# Patient Record
Sex: FEMALE | Race: BLACK OR AFRICAN AMERICAN | NOT HISPANIC OR LATINO | URBAN - METROPOLITAN AREA
[De-identification: names, ages, dates, MRNs, and addresses within clinical notes are randomized per-mention and may not be internally consistent; named-entity substitution may affect disease eponyms.]

---

## 2017-01-01 ENCOUNTER — INPATIENT (INPATIENT)
Facility: HOSPITAL | Age: 77
LOS: 1 days | DRG: 871 | End: 2017-01-23
Attending: INTERNAL MEDICINE | Admitting: INTERNAL MEDICINE
Payer: MEDICARE

## 2017-01-01 ENCOUNTER — INPATIENT (INPATIENT)
Facility: HOSPITAL | Age: 77
LOS: 9 days | Discharge: NURSING, REHAB OR LONG-TERM CARE | DRG: 871 | End: 2017-01-12
Attending: INTERNAL MEDICINE | Admitting: INTERNAL MEDICINE
Payer: MEDICARE

## 2017-01-01 VITALS
RESPIRATION RATE: 18 BRPM | DIASTOLIC BLOOD PRESSURE: 66 MMHG | WEIGHT: 134.92 LBS | TEMPERATURE: 98 F | HEART RATE: 118 BPM | OXYGEN SATURATION: 97 % | SYSTOLIC BLOOD PRESSURE: 128 MMHG | HEIGHT: 66 IN

## 2017-01-01 VITALS
WEIGHT: 145.06 LBS | TEMPERATURE: 101 F | RESPIRATION RATE: 22 BRPM | DIASTOLIC BLOOD PRESSURE: 72 MMHG | HEART RATE: 104 BPM | HEIGHT: 66 IN | OXYGEN SATURATION: 98 % | SYSTOLIC BLOOD PRESSURE: 105 MMHG

## 2017-01-01 VITALS
SYSTOLIC BLOOD PRESSURE: 136 MMHG | HEART RATE: 83 BPM | TEMPERATURE: 99 F | DIASTOLIC BLOOD PRESSURE: 76 MMHG | OXYGEN SATURATION: 99 % | RESPIRATION RATE: 18 BRPM

## 2017-01-01 VITALS — TEMPERATURE: 100 F

## 2017-01-01 DIAGNOSIS — G30.1 ALZHEIMER'S DISEASE WITH LATE ONSET: ICD-10-CM

## 2017-01-01 DIAGNOSIS — I48.2 CHRONIC ATRIAL FIBRILLATION: ICD-10-CM

## 2017-01-01 DIAGNOSIS — R13.10 DYSPHAGIA, UNSPECIFIED: ICD-10-CM

## 2017-01-01 DIAGNOSIS — J69.0 PNEUMONITIS DUE TO INHALATION OF FOOD AND VOMIT: ICD-10-CM

## 2017-01-01 DIAGNOSIS — Z71.89 OTHER SPECIFIED COUNSELING: ICD-10-CM

## 2017-01-01 DIAGNOSIS — A41.9 SEPSIS, UNSPECIFIED ORGANISM: ICD-10-CM

## 2017-01-01 DIAGNOSIS — I10 ESSENTIAL (PRIMARY) HYPERTENSION: ICD-10-CM

## 2017-01-01 DIAGNOSIS — R79.89 OTHER SPECIFIED ABNORMAL FINDINGS OF BLOOD CHEMISTRY: ICD-10-CM

## 2017-01-01 DIAGNOSIS — Z66 DO NOT RESUSCITATE: ICD-10-CM

## 2017-01-01 DIAGNOSIS — G40.909 EPILEPSY, UNSPECIFIED, NOT INTRACTABLE, WITHOUT STATUS EPILEPTICUS: ICD-10-CM

## 2017-01-01 DIAGNOSIS — Z86.73 PERSONAL HISTORY OF TRANSIENT ISCHEMIC ATTACK (TIA), AND CEREBRAL INFARCTION WITHOUT RESIDUAL DEFICITS: ICD-10-CM

## 2017-01-01 DIAGNOSIS — R56.9 UNSPECIFIED CONVULSIONS: ICD-10-CM

## 2017-01-01 DIAGNOSIS — Z29.9 ENCOUNTER FOR PROPHYLACTIC MEASURES, UNSPECIFIED: ICD-10-CM

## 2017-01-01 DIAGNOSIS — R94.5 ABNORMAL RESULTS OF LIVER FUNCTION STUDIES: ICD-10-CM

## 2017-01-01 DIAGNOSIS — F02.81 DEMENTIA IN OTHER DISEASES CLASSIFIED ELSEWHERE, UNSPECIFIED SEVERITY, WITH BEHAVIORAL DISTURBANCE: ICD-10-CM

## 2017-01-01 DIAGNOSIS — G30.9 ALZHEIMER'S DISEASE, UNSPECIFIED: ICD-10-CM

## 2017-01-01 DIAGNOSIS — E86.0 DEHYDRATION: ICD-10-CM

## 2017-01-01 DIAGNOSIS — J18.9 PNEUMONIA, UNSPECIFIED ORGANISM: ICD-10-CM

## 2017-01-01 DIAGNOSIS — L98.492 NON-PRESSURE CHRONIC ULCER OF SKIN OF OTHER SITES WITH FAT LAYER EXPOSED: ICD-10-CM

## 2017-01-01 DIAGNOSIS — E87.6 HYPOKALEMIA: ICD-10-CM

## 2017-01-01 DIAGNOSIS — L89.154 PRESSURE ULCER OF SACRAL REGION, STAGE 4: ICD-10-CM

## 2017-01-01 DIAGNOSIS — N39.0 URINARY TRACT INFECTION, SITE NOT SPECIFIED: ICD-10-CM

## 2017-01-01 DIAGNOSIS — I48.91 UNSPECIFIED ATRIAL FIBRILLATION: ICD-10-CM

## 2017-01-01 LAB
24R-OH-CALCIDIOL SERPL-MCNC: 26 NG/ML — LOW (ref 30–100)
24R-OH-CALCIDIOL SERPL-MCNC: 30.3 NG/ML — SIGNIFICANT CHANGE UP (ref 30–100)
ALBUMIN SERPL ELPH-MCNC: 1.6 G/DL — LOW (ref 3.5–5)
ALBUMIN SERPL ELPH-MCNC: 2 G/DL — LOW (ref 3.5–5)
ALBUMIN SERPL ELPH-MCNC: 2.1 G/DL — LOW (ref 3.5–5)
ALBUMIN SERPL ELPH-MCNC: 2.3 G/DL — LOW (ref 3.5–5)
ALBUMIN SERPL ELPH-MCNC: 2.5 G/DL — LOW (ref 3.5–5)
ALBUMIN SERPL ELPH-MCNC: 2.5 G/DL — LOW (ref 3.5–5)
ALP SERPL-CCNC: 137 U/L — HIGH (ref 40–120)
ALP SERPL-CCNC: 173 U/L — HIGH (ref 40–120)
ALP SERPL-CCNC: 175 U/L — HIGH (ref 40–120)
ALP SERPL-CCNC: 226 U/L — HIGH (ref 40–120)
ALP SERPL-CCNC: 229 U/L — HIGH (ref 40–120)
ALP SERPL-CCNC: 269 U/L — HIGH (ref 40–120)
ALT FLD-CCNC: 103 U/L DA — HIGH (ref 10–60)
ALT FLD-CCNC: 127 U/L DA — HIGH (ref 10–60)
ALT FLD-CCNC: 159 U/L DA — HIGH (ref 10–60)
ALT FLD-CCNC: 54 U/L DA — SIGNIFICANT CHANGE UP (ref 10–60)
ALT FLD-CCNC: 76 U/L DA — HIGH (ref 10–60)
ALT FLD-CCNC: 84 U/L DA — HIGH (ref 10–60)
ANION GAP SERPL CALC-SCNC: 10 MMOL/L — SIGNIFICANT CHANGE UP (ref 5–17)
ANION GAP SERPL CALC-SCNC: 4 MMOL/L — LOW (ref 5–17)
ANION GAP SERPL CALC-SCNC: 5 MMOL/L — SIGNIFICANT CHANGE UP (ref 5–17)
ANION GAP SERPL CALC-SCNC: 6 MMOL/L — SIGNIFICANT CHANGE UP (ref 5–17)
ANION GAP SERPL CALC-SCNC: 7 MMOL/L — SIGNIFICANT CHANGE UP (ref 5–17)
ANION GAP SERPL CALC-SCNC: 8 MMOL/L — SIGNIFICANT CHANGE UP (ref 5–17)
ANION GAP SERPL CALC-SCNC: 9 MMOL/L — SIGNIFICANT CHANGE UP (ref 5–17)
APPEARANCE UR: CLEAR — SIGNIFICANT CHANGE UP
APTT BLD: 24.7 SEC — LOW (ref 27.5–37.4)
AST SERPL-CCNC: 150 U/L — HIGH (ref 10–40)
AST SERPL-CCNC: 34 U/L — SIGNIFICANT CHANGE UP (ref 10–40)
AST SERPL-CCNC: 42 U/L — HIGH (ref 10–40)
AST SERPL-CCNC: 67 U/L — HIGH (ref 10–40)
AST SERPL-CCNC: 83 U/L — HIGH (ref 10–40)
AST SERPL-CCNC: 85 U/L — HIGH (ref 10–40)
BASE EXCESS BLDA CALC-SCNC: 12.4 MMOL/L — HIGH (ref -2–2)
BASOPHILS # BLD AUTO: 0.1 K/UL — SIGNIFICANT CHANGE UP (ref 0–0.2)
BASOPHILS # BLD AUTO: 0.1 K/UL — SIGNIFICANT CHANGE UP (ref 0–0.2)
BASOPHILS # BLD AUTO: 0.2 K/UL — SIGNIFICANT CHANGE UP (ref 0–0.2)
BASOPHILS # BLD AUTO: 0.2 K/UL — SIGNIFICANT CHANGE UP (ref 0–0.2)
BASOPHILS # BLD AUTO: 0.3 K/UL — HIGH (ref 0–0.2)
BASOPHILS NFR BLD AUTO: 0.5 % — SIGNIFICANT CHANGE UP (ref 0–2)
BASOPHILS NFR BLD AUTO: 0.8 % — SIGNIFICANT CHANGE UP (ref 0–2)
BASOPHILS NFR BLD AUTO: 0.9 % — SIGNIFICANT CHANGE UP (ref 0–2)
BASOPHILS NFR BLD AUTO: 1.2 % — SIGNIFICANT CHANGE UP (ref 0–2)
BASOPHILS NFR BLD AUTO: 1.5 % — SIGNIFICANT CHANGE UP (ref 0–2)
BILIRUB DIRECT SERPL-MCNC: 0.1 MG/DL — SIGNIFICANT CHANGE UP (ref 0–0.2)
BILIRUB INDIRECT FLD-MCNC: 0.2 MG/DL — SIGNIFICANT CHANGE UP (ref 0.2–1)
BILIRUB SERPL-MCNC: 0.2 MG/DL — SIGNIFICANT CHANGE UP (ref 0.2–1.2)
BILIRUB SERPL-MCNC: 0.3 MG/DL — SIGNIFICANT CHANGE UP (ref 0.2–1.2)
BILIRUB SERPL-MCNC: 0.3 MG/DL — SIGNIFICANT CHANGE UP (ref 0.2–1.2)
BILIRUB SERPL-MCNC: 0.4 MG/DL — SIGNIFICANT CHANGE UP (ref 0.2–1.2)
BILIRUB SERPL-MCNC: 0.4 MG/DL — SIGNIFICANT CHANGE UP (ref 0.2–1.2)
BILIRUB SERPL-MCNC: 0.5 MG/DL — SIGNIFICANT CHANGE UP (ref 0.2–1.2)
BILIRUB UR-MCNC: NEGATIVE — SIGNIFICANT CHANGE UP
BLOOD GAS COMMENTS ARTERIAL: SIGNIFICANT CHANGE UP
BUN SERPL-MCNC: 10 MG/DL — SIGNIFICANT CHANGE UP (ref 7–18)
BUN SERPL-MCNC: 10 MG/DL — SIGNIFICANT CHANGE UP (ref 7–18)
BUN SERPL-MCNC: 15 MG/DL — SIGNIFICANT CHANGE UP (ref 7–18)
BUN SERPL-MCNC: 18 MG/DL — SIGNIFICANT CHANGE UP (ref 7–18)
BUN SERPL-MCNC: 24 MG/DL — HIGH (ref 7–18)
BUN SERPL-MCNC: 29 MG/DL — HIGH (ref 7–18)
BUN SERPL-MCNC: 37 MG/DL — HIGH (ref 7–18)
BUN SERPL-MCNC: 55 MG/DL — HIGH (ref 7–18)
BUN SERPL-MCNC: 74 MG/DL — HIGH (ref 7–18)
CALCIUM SERPL-MCNC: 8.6 MG/DL — SIGNIFICANT CHANGE UP (ref 8.4–10.5)
CALCIUM SERPL-MCNC: 8.6 MG/DL — SIGNIFICANT CHANGE UP (ref 8.4–10.5)
CALCIUM SERPL-MCNC: 8.7 MG/DL — SIGNIFICANT CHANGE UP (ref 8.4–10.5)
CALCIUM SERPL-MCNC: 8.9 MG/DL — SIGNIFICANT CHANGE UP (ref 8.4–10.5)
CALCIUM SERPL-MCNC: 9 MG/DL — SIGNIFICANT CHANGE UP (ref 8.4–10.5)
CALCIUM SERPL-MCNC: 9.6 MG/DL — SIGNIFICANT CHANGE UP (ref 8.4–10.5)
CALCIUM SERPL-MCNC: 9.9 MG/DL — SIGNIFICANT CHANGE UP (ref 8.4–10.5)
CHLORIDE SERPL-SCNC: 103 MMOL/L — SIGNIFICANT CHANGE UP (ref 96–108)
CHLORIDE SERPL-SCNC: 104 MMOL/L — SIGNIFICANT CHANGE UP (ref 96–108)
CHLORIDE SERPL-SCNC: 106 MMOL/L — SIGNIFICANT CHANGE UP (ref 96–108)
CHLORIDE SERPL-SCNC: 106 MMOL/L — SIGNIFICANT CHANGE UP (ref 96–108)
CHLORIDE SERPL-SCNC: 107 MMOL/L — SIGNIFICANT CHANGE UP (ref 96–108)
CHLORIDE SERPL-SCNC: 107 MMOL/L — SIGNIFICANT CHANGE UP (ref 96–108)
CHLORIDE SERPL-SCNC: 110 MMOL/L — HIGH (ref 96–108)
CHLORIDE SERPL-SCNC: 110 MMOL/L — HIGH (ref 96–108)
CHLORIDE SERPL-SCNC: 99 MMOL/L — SIGNIFICANT CHANGE UP (ref 96–108)
CHOLEST SERPL-MCNC: 247 MG/DL — HIGH (ref 10–199)
CHOLEST SERPL-MCNC: 256 MG/DL — HIGH (ref 10–199)
CK MB BLD-MCNC: <5 % — HIGH (ref 0–3.5)
CK MB CFR SERPL CALC: <1 NG/ML — SIGNIFICANT CHANGE UP (ref 0–3.6)
CK SERPL-CCNC: 20 U/L — LOW (ref 21–215)
CO2 SERPL-SCNC: 29 MMOL/L — SIGNIFICANT CHANGE UP (ref 22–31)
CO2 SERPL-SCNC: 30 MMOL/L — SIGNIFICANT CHANGE UP (ref 22–31)
CO2 SERPL-SCNC: 30 MMOL/L — SIGNIFICANT CHANGE UP (ref 22–31)
CO2 SERPL-SCNC: 31 MMOL/L — SIGNIFICANT CHANGE UP (ref 22–31)
CO2 SERPL-SCNC: 32 MMOL/L — HIGH (ref 22–31)
CO2 SERPL-SCNC: 32 MMOL/L — HIGH (ref 22–31)
CO2 SERPL-SCNC: 36 MMOL/L — HIGH (ref 22–31)
CO2 SERPL-SCNC: 37 MMOL/L — HIGH (ref 22–31)
CO2 SERPL-SCNC: 44 MMOL/L — HIGH (ref 22–31)
COLOR SPEC: SIGNIFICANT CHANGE UP
CREAT SERPL-MCNC: 0.38 MG/DL — LOW (ref 0.5–1.3)
CREAT SERPL-MCNC: 0.38 MG/DL — LOW (ref 0.5–1.3)
CREAT SERPL-MCNC: 0.39 MG/DL — LOW (ref 0.5–1.3)
CREAT SERPL-MCNC: 0.42 MG/DL — LOW (ref 0.5–1.3)
CREAT SERPL-MCNC: 0.44 MG/DL — LOW (ref 0.5–1.3)
CREAT SERPL-MCNC: 0.51 MG/DL — SIGNIFICANT CHANGE UP (ref 0.5–1.3)
CREAT SERPL-MCNC: 0.61 MG/DL — SIGNIFICANT CHANGE UP (ref 0.5–1.3)
CREAT SERPL-MCNC: 0.77 MG/DL — SIGNIFICANT CHANGE UP (ref 0.5–1.3)
CREAT SERPL-MCNC: 0.83 MG/DL — SIGNIFICANT CHANGE UP (ref 0.5–1.3)
CULTURE RESULTS: NO GROWTH — SIGNIFICANT CHANGE UP
CULTURE RESULTS: SIGNIFICANT CHANGE UP
CULTURE RESULTS: SIGNIFICANT CHANGE UP
DIFF PNL FLD: ABNORMAL
EOSINOPHIL # BLD AUTO: 0 K/UL — SIGNIFICANT CHANGE UP (ref 0–0.5)
EOSINOPHIL # BLD AUTO: 0.1 K/UL — SIGNIFICANT CHANGE UP (ref 0–0.5)
EOSINOPHIL # BLD AUTO: 0.1 K/UL — SIGNIFICANT CHANGE UP (ref 0–0.5)
EOSINOPHIL NFR BLD AUTO: 0 % — SIGNIFICANT CHANGE UP (ref 0–6)
EOSINOPHIL NFR BLD AUTO: 0 % — SIGNIFICANT CHANGE UP (ref 0–6)
EOSINOPHIL NFR BLD AUTO: 0.1 % — SIGNIFICANT CHANGE UP (ref 0–6)
EOSINOPHIL NFR BLD AUTO: 0.3 % — SIGNIFICANT CHANGE UP (ref 0–6)
EOSINOPHIL NFR BLD AUTO: 1.2 % — SIGNIFICANT CHANGE UP (ref 0–6)
FLUAV SPEC QL CULT: NEGATIVE — SIGNIFICANT CHANGE UP
FLUBV AG SPEC QL IA: NEGATIVE — SIGNIFICANT CHANGE UP
FOLATE SERPL-MCNC: >20 NG/ML — SIGNIFICANT CHANGE UP (ref 4.8–24.2)
FOLATE SERPL-MCNC: >20 NG/ML — SIGNIFICANT CHANGE UP (ref 4.8–24.2)
GLUCOSE SERPL-MCNC: 118 MG/DL — HIGH (ref 70–99)
GLUCOSE SERPL-MCNC: 126 MG/DL — HIGH (ref 70–99)
GLUCOSE SERPL-MCNC: 131 MG/DL — HIGH (ref 70–99)
GLUCOSE SERPL-MCNC: 132 MG/DL — HIGH (ref 70–99)
GLUCOSE SERPL-MCNC: 138 MG/DL — HIGH (ref 70–99)
GLUCOSE SERPL-MCNC: 159 MG/DL — HIGH (ref 70–99)
GLUCOSE SERPL-MCNC: 189 MG/DL — HIGH (ref 70–99)
GLUCOSE SERPL-MCNC: 222 MG/DL — HIGH (ref 70–99)
GLUCOSE SERPL-MCNC: 238 MG/DL — HIGH (ref 70–99)
GLUCOSE UR QL: NEGATIVE — SIGNIFICANT CHANGE UP
GRAM STN FLD: SIGNIFICANT CHANGE UP
HBA1C BLD-MCNC: 6.2 % — HIGH (ref 4–5.6)
HBA1C BLD-MCNC: 6.4 % — HIGH (ref 4–5.6)
HCO3 BLDA-SCNC: 40 MMOL/L — HIGH (ref 23–27)
HCT VFR BLD CALC: 33.8 % — LOW (ref 34.5–45)
HCT VFR BLD CALC: 34.7 % — SIGNIFICANT CHANGE UP (ref 34.5–45)
HCT VFR BLD CALC: 35.2 % — SIGNIFICANT CHANGE UP (ref 34.5–45)
HCT VFR BLD CALC: 35.3 % — SIGNIFICANT CHANGE UP (ref 34.5–45)
HCT VFR BLD CALC: 39 % — SIGNIFICANT CHANGE UP (ref 34.5–45)
HCT VFR BLD CALC: 39 % — SIGNIFICANT CHANGE UP (ref 34.5–45)
HCT VFR BLD CALC: 41 % — SIGNIFICANT CHANGE UP (ref 34.5–45)
HCT VFR BLD CALC: 41.3 % — SIGNIFICANT CHANGE UP (ref 34.5–45)
HCT VFR BLD CALC: 47 % — HIGH (ref 34.5–45)
HDLC SERPL-MCNC: 28 MG/DL — LOW (ref 40–125)
HDLC SERPL-MCNC: 46 MG/DL — SIGNIFICANT CHANGE UP (ref 40–125)
HGB BLD-MCNC: 11 G/DL — LOW (ref 11.5–15.5)
HGB BLD-MCNC: 11.2 G/DL — LOW (ref 11.5–15.5)
HGB BLD-MCNC: 11.3 G/DL — LOW (ref 11.5–15.5)
HGB BLD-MCNC: 11.6 G/DL — SIGNIFICANT CHANGE UP (ref 11.5–15.5)
HGB BLD-MCNC: 12.4 G/DL — SIGNIFICANT CHANGE UP (ref 11.5–15.5)
HGB BLD-MCNC: 12.8 G/DL — SIGNIFICANT CHANGE UP (ref 11.5–15.5)
HGB BLD-MCNC: 12.9 G/DL — SIGNIFICANT CHANGE UP (ref 11.5–15.5)
HGB BLD-MCNC: 13.2 G/DL — SIGNIFICANT CHANGE UP (ref 11.5–15.5)
HGB BLD-MCNC: 14.5 G/DL — SIGNIFICANT CHANGE UP (ref 11.5–15.5)
HOROWITZ INDEX BLDA+IHG-RTO: 100 — SIGNIFICANT CHANGE UP
INR BLD: 1.04 RATIO — SIGNIFICANT CHANGE UP (ref 0.88–1.16)
KETONES UR-MCNC: NEGATIVE — SIGNIFICANT CHANGE UP
LACTATE SERPL-SCNC: 1.1 MMOL/L — SIGNIFICANT CHANGE UP (ref 0.7–2)
LACTATE SERPL-SCNC: 1.8 MMOL/L — SIGNIFICANT CHANGE UP (ref 0.7–2)
LACTATE SERPL-SCNC: 2.4 MMOL/L — HIGH (ref 0.7–2)
LEUKOCYTE ESTERASE UR-ACNC: ABNORMAL
LIPID PNL WITH DIRECT LDL SERPL: 169 MG/DL — SIGNIFICANT CHANGE UP
LIPID PNL WITH DIRECT LDL SERPL: 172 MG/DL — SIGNIFICANT CHANGE UP
LYMPHOCYTES # BLD AUTO: 1.4 K/UL — SIGNIFICANT CHANGE UP (ref 1–3.3)
LYMPHOCYTES # BLD AUTO: 1.7 K/UL — SIGNIFICANT CHANGE UP (ref 1–3.3)
LYMPHOCYTES # BLD AUTO: 1.9 K/UL — SIGNIFICANT CHANGE UP (ref 1–3.3)
LYMPHOCYTES # BLD AUTO: 10.5 % — LOW (ref 13–44)
LYMPHOCYTES # BLD AUTO: 12 % — LOW (ref 13–44)
LYMPHOCYTES # BLD AUTO: 2.3 K/UL — SIGNIFICANT CHANGE UP (ref 1–3.3)
LYMPHOCYTES # BLD AUTO: 2.8 K/UL — SIGNIFICANT CHANGE UP (ref 1–3.3)
LYMPHOCYTES # BLD AUTO: 24.4 % — SIGNIFICANT CHANGE UP (ref 13–44)
LYMPHOCYTES # BLD AUTO: 3 % — LOW (ref 13–44)
LYMPHOCYTES # BLD AUTO: 8.4 % — LOW (ref 13–44)
LYMPHOCYTES # BLD AUTO: 8.5 % — LOW (ref 13–44)
LYMPHOCYTES # BLD AUTO: 9.4 % — LOW (ref 13–44)
MAGNESIUM SERPL-MCNC: 2.4 MG/DL — SIGNIFICANT CHANGE UP (ref 1.8–2.4)
MAGNESIUM SERPL-MCNC: 2.5 MG/DL — HIGH (ref 1.8–2.4)
MAGNESIUM SERPL-MCNC: 2.7 MG/DL — HIGH (ref 1.8–2.4)
MANUAL DIF COMMENT BLD-IMP: SIGNIFICANT CHANGE UP
MCHC RBC-ENTMCNC: 30.9 GM/DL — LOW (ref 32–36)
MCHC RBC-ENTMCNC: 31.2 PG — SIGNIFICANT CHANGE UP (ref 27–34)
MCHC RBC-ENTMCNC: 31.2 PG — SIGNIFICANT CHANGE UP (ref 27–34)
MCHC RBC-ENTMCNC: 31.5 GM/DL — LOW (ref 32–36)
MCHC RBC-ENTMCNC: 31.7 PG — SIGNIFICANT CHANGE UP (ref 27–34)
MCHC RBC-ENTMCNC: 31.8 GM/DL — LOW (ref 32–36)
MCHC RBC-ENTMCNC: 31.8 GM/DL — LOW (ref 32–36)
MCHC RBC-ENTMCNC: 31.9 PG — SIGNIFICANT CHANGE UP (ref 27–34)
MCHC RBC-ENTMCNC: 31.9 PG — SIGNIFICANT CHANGE UP (ref 27–34)
MCHC RBC-ENTMCNC: 32 GM/DL — SIGNIFICANT CHANGE UP (ref 32–36)
MCHC RBC-ENTMCNC: 32 PG — SIGNIFICANT CHANGE UP (ref 27–34)
MCHC RBC-ENTMCNC: 32 PG — SIGNIFICANT CHANGE UP (ref 27–34)
MCHC RBC-ENTMCNC: 32.4 GM/DL — SIGNIFICANT CHANGE UP (ref 32–36)
MCHC RBC-ENTMCNC: 32.5 PG — SIGNIFICANT CHANGE UP (ref 27–34)
MCHC RBC-ENTMCNC: 32.7 GM/DL — SIGNIFICANT CHANGE UP (ref 32–36)
MCHC RBC-ENTMCNC: 32.8 GM/DL — SIGNIFICANT CHANGE UP (ref 32–36)
MCHC RBC-ENTMCNC: 32.9 GM/DL — SIGNIFICANT CHANGE UP (ref 32–36)
MCHC RBC-ENTMCNC: 33.2 PG — SIGNIFICANT CHANGE UP (ref 27–34)
MCV RBC AUTO: 103 FL — HIGH (ref 80–100)
MCV RBC AUTO: 103.3 FL — HIGH (ref 80–100)
MCV RBC AUTO: 103.7 FL — HIGH (ref 80–100)
MCV RBC AUTO: 97 FL — SIGNIFICANT CHANGE UP (ref 80–100)
MCV RBC AUTO: 97.1 FL — SIGNIFICANT CHANGE UP (ref 80–100)
MCV RBC AUTO: 97.4 FL — SIGNIFICANT CHANGE UP (ref 80–100)
MCV RBC AUTO: 98 FL — SIGNIFICANT CHANGE UP (ref 80–100)
MCV RBC AUTO: 98.1 FL — SIGNIFICANT CHANGE UP (ref 80–100)
MCV RBC AUTO: 98.4 FL — SIGNIFICANT CHANGE UP (ref 80–100)
MONOCYTES # BLD AUTO: 0.7 K/UL — SIGNIFICANT CHANGE UP (ref 0–0.9)
MONOCYTES # BLD AUTO: 0.7 K/UL — SIGNIFICANT CHANGE UP (ref 0–0.9)
MONOCYTES # BLD AUTO: 0.9 K/UL — SIGNIFICANT CHANGE UP (ref 0–0.9)
MONOCYTES # BLD AUTO: 1 K/UL — HIGH (ref 0–0.9)
MONOCYTES # BLD AUTO: 1.3 K/UL — HIGH (ref 0–0.9)
MONOCYTES NFR BLD AUTO: 3.4 % — SIGNIFICANT CHANGE UP (ref 2–14)
MONOCYTES NFR BLD AUTO: 4.3 % — SIGNIFICANT CHANGE UP (ref 2–14)
MONOCYTES NFR BLD AUTO: 4.8 % — SIGNIFICANT CHANGE UP (ref 2–14)
MONOCYTES NFR BLD AUTO: 6.1 % — SIGNIFICANT CHANGE UP (ref 2–14)
MONOCYTES NFR BLD AUTO: 7 % — SIGNIFICANT CHANGE UP (ref 2–14)
MONOCYTES NFR BLD AUTO: 7 % — SIGNIFICANT CHANGE UP (ref 2–14)
MONOCYTES NFR BLD AUTO: 7.5 % — SIGNIFICANT CHANGE UP (ref 2–14)
NEUTROPHILS # BLD AUTO: 14 K/UL — HIGH (ref 1.8–7.4)
NEUTROPHILS # BLD AUTO: 17.5 K/UL — HIGH (ref 1.8–7.4)
NEUTROPHILS # BLD AUTO: 17.6 K/UL — HIGH (ref 1.8–7.4)
NEUTROPHILS # BLD AUTO: 17.9 K/UL — HIGH (ref 1.8–7.4)
NEUTROPHILS # BLD AUTO: 7.6 K/UL — HIGH (ref 1.8–7.4)
NEUTROPHILS NFR BLD AUTO: 65.4 % — SIGNIFICANT CHANGE UP (ref 43–77)
NEUTROPHILS NFR BLD AUTO: 78 % — HIGH (ref 43–77)
NEUTROPHILS NFR BLD AUTO: 82 % — HIGH (ref 43–77)
NEUTROPHILS NFR BLD AUTO: 83 % — HIGH (ref 43–77)
NEUTROPHILS NFR BLD AUTO: 85.9 % — HIGH (ref 43–77)
NEUTROPHILS NFR BLD AUTO: 86.4 % — HIGH (ref 43–77)
NEUTROPHILS NFR BLD AUTO: 86.6 % — HIGH (ref 43–77)
NEUTS BAND # BLD: 3 % — SIGNIFICANT CHANGE UP (ref 0–8)
NITRITE UR-MCNC: NEGATIVE — SIGNIFICANT CHANGE UP
NT-PROBNP SERPL-SCNC: 183 PG/ML — SIGNIFICANT CHANGE UP (ref 0–450)
NT-PROBNP SERPL-SCNC: 321 PG/ML — SIGNIFICANT CHANGE UP (ref 0–450)
PCO2 BLDA: 73 MMHG — CRITICAL HIGH (ref 32–46)
PH BLDA: 7.36 — SIGNIFICANT CHANGE UP (ref 7.35–7.45)
PH UR: 8 — SIGNIFICANT CHANGE UP (ref 4.8–8)
PHOSPHATE SERPL-MCNC: 1.5 MG/DL — LOW (ref 2.5–4.5)
PHOSPHATE SERPL-MCNC: 1.7 MG/DL — LOW (ref 2.5–4.5)
PHOSPHATE SERPL-MCNC: 2.2 MG/DL — LOW (ref 2.5–4.5)
PHOSPHATE SERPL-MCNC: 2.2 MG/DL — LOW (ref 2.5–4.5)
PHOSPHATE SERPL-MCNC: 3.1 MG/DL — SIGNIFICANT CHANGE UP (ref 2.5–4.5)
PLAT MORPH BLD: NORMAL — SIGNIFICANT CHANGE UP
PLATELET # BLD AUTO: 215 K/UL — SIGNIFICANT CHANGE UP (ref 150–400)
PLATELET # BLD AUTO: 220 K/UL — SIGNIFICANT CHANGE UP (ref 150–400)
PLATELET # BLD AUTO: 231 K/UL — SIGNIFICANT CHANGE UP (ref 150–400)
PLATELET # BLD AUTO: 248 K/UL — SIGNIFICANT CHANGE UP (ref 150–400)
PLATELET # BLD AUTO: 282 K/UL — SIGNIFICANT CHANGE UP (ref 150–400)
PLATELET # BLD AUTO: 299 K/UL — SIGNIFICANT CHANGE UP (ref 150–400)
PLATELET # BLD AUTO: 314 K/UL — SIGNIFICANT CHANGE UP (ref 150–400)
PLATELET # BLD AUTO: 324 K/UL — SIGNIFICANT CHANGE UP (ref 150–400)
PLATELET # BLD AUTO: 355 K/UL — SIGNIFICANT CHANGE UP (ref 150–400)
PO2 BLDA: 149 MMHG — HIGH (ref 74–108)
POTASSIUM SERPL-MCNC: 3 MMOL/L — LOW (ref 3.5–5.3)
POTASSIUM SERPL-MCNC: 3.4 MMOL/L — LOW (ref 3.5–5.3)
POTASSIUM SERPL-MCNC: 3.5 MMOL/L — SIGNIFICANT CHANGE UP (ref 3.5–5.3)
POTASSIUM SERPL-MCNC: 3.6 MMOL/L — SIGNIFICANT CHANGE UP (ref 3.5–5.3)
POTASSIUM SERPL-MCNC: 3.9 MMOL/L — SIGNIFICANT CHANGE UP (ref 3.5–5.3)
POTASSIUM SERPL-MCNC: 4 MMOL/L — SIGNIFICANT CHANGE UP (ref 3.5–5.3)
POTASSIUM SERPL-MCNC: 4.2 MMOL/L — SIGNIFICANT CHANGE UP (ref 3.5–5.3)
POTASSIUM SERPL-MCNC: 4.3 MMOL/L — SIGNIFICANT CHANGE UP (ref 3.5–5.3)
POTASSIUM SERPL-MCNC: 4.5 MMOL/L — SIGNIFICANT CHANGE UP (ref 3.5–5.3)
POTASSIUM SERPL-SCNC: 3 MMOL/L — LOW (ref 3.5–5.3)
POTASSIUM SERPL-SCNC: 3.4 MMOL/L — LOW (ref 3.5–5.3)
POTASSIUM SERPL-SCNC: 3.5 MMOL/L — SIGNIFICANT CHANGE UP (ref 3.5–5.3)
POTASSIUM SERPL-SCNC: 3.6 MMOL/L — SIGNIFICANT CHANGE UP (ref 3.5–5.3)
POTASSIUM SERPL-SCNC: 3.9 MMOL/L — SIGNIFICANT CHANGE UP (ref 3.5–5.3)
POTASSIUM SERPL-SCNC: 4 MMOL/L — SIGNIFICANT CHANGE UP (ref 3.5–5.3)
POTASSIUM SERPL-SCNC: 4.2 MMOL/L — SIGNIFICANT CHANGE UP (ref 3.5–5.3)
POTASSIUM SERPL-SCNC: 4.3 MMOL/L — SIGNIFICANT CHANGE UP (ref 3.5–5.3)
POTASSIUM SERPL-SCNC: 4.5 MMOL/L — SIGNIFICANT CHANGE UP (ref 3.5–5.3)
PROCALCITONIN SERPL-MCNC: 0.79 NG/ML — HIGH (ref 0–0.05)
PROT SERPL-MCNC: 6.9 G/DL — SIGNIFICANT CHANGE UP (ref 6–8.3)
PROT SERPL-MCNC: 7.2 G/DL — SIGNIFICANT CHANGE UP (ref 6–8.3)
PROT SERPL-MCNC: 7.6 G/DL — SIGNIFICANT CHANGE UP (ref 6–8.3)
PROT SERPL-MCNC: 8.1 G/DL — SIGNIFICANT CHANGE UP (ref 6–8.3)
PROT SERPL-MCNC: 8.4 G/DL — HIGH (ref 6–8.3)
PROT SERPL-MCNC: 8.4 G/DL — HIGH (ref 6–8.3)
PROT UR-MCNC: 30 MG/DL
PROTHROM AB SERPL-ACNC: 11.7 SEC — SIGNIFICANT CHANGE UP (ref 10–13.1)
RBC # BLD: 3.4 M/UL — LOW (ref 3.8–5.2)
RBC # BLD: 3.48 M/UL — LOW (ref 3.8–5.2)
RBC # BLD: 3.53 M/UL — LOW (ref 3.8–5.2)
RBC # BLD: 3.63 M/UL — LOW (ref 3.8–5.2)
RBC # BLD: 3.98 M/UL — SIGNIFICANT CHANGE UP (ref 3.8–5.2)
RBC # BLD: 3.98 M/UL — SIGNIFICANT CHANGE UP (ref 3.8–5.2)
RBC # BLD: 4.01 M/UL — SIGNIFICANT CHANGE UP (ref 3.8–5.2)
RBC # BLD: 4.22 M/UL — SIGNIFICANT CHANGE UP (ref 3.8–5.2)
RBC # BLD: 4.55 M/UL — SIGNIFICANT CHANGE UP (ref 3.8–5.2)
RBC # FLD: 13.9 % — SIGNIFICANT CHANGE UP (ref 10.3–14.5)
RBC # FLD: 14.4 % — SIGNIFICANT CHANGE UP (ref 10.3–14.5)
RBC # FLD: 14.5 % — SIGNIFICANT CHANGE UP (ref 10.3–14.5)
RBC # FLD: 14.9 % — HIGH (ref 10.3–14.5)
RBC # FLD: 14.9 % — HIGH (ref 10.3–14.5)
RBC # FLD: 15 % — HIGH (ref 10.3–14.5)
RBC # FLD: 15.2 % — HIGH (ref 10.3–14.5)
RBC BLD AUTO: NORMAL — SIGNIFICANT CHANGE UP
SAO2 % BLDA: 99 % — HIGH (ref 92–96)
SODIUM SERPL-SCNC: 142 MMOL/L — SIGNIFICANT CHANGE UP (ref 135–145)
SODIUM SERPL-SCNC: 142 MMOL/L — SIGNIFICANT CHANGE UP (ref 135–145)
SODIUM SERPL-SCNC: 143 MMOL/L — SIGNIFICANT CHANGE UP (ref 135–145)
SODIUM SERPL-SCNC: 145 MMOL/L — SIGNIFICANT CHANGE UP (ref 135–145)
SODIUM SERPL-SCNC: 145 MMOL/L — SIGNIFICANT CHANGE UP (ref 135–145)
SODIUM SERPL-SCNC: 147 MMOL/L — HIGH (ref 135–145)
SODIUM SERPL-SCNC: 149 MMOL/L — HIGH (ref 135–145)
SODIUM SERPL-SCNC: 149 MMOL/L — HIGH (ref 135–145)
SODIUM SERPL-SCNC: 152 MMOL/L — HIGH (ref 135–145)
SP GR SPEC: 1.01 — SIGNIFICANT CHANGE UP (ref 1.01–1.02)
SPECIMEN SOURCE: SIGNIFICANT CHANGE UP
TOTAL CHOLESTEROL/HDL RATIO MEASUREMENT: 5.4 RATIO — SIGNIFICANT CHANGE UP (ref 3.3–7.1)
TOTAL CHOLESTEROL/HDL RATIO MEASUREMENT: 9.1 RATIO — HIGH (ref 3.3–7.1)
TRIGL SERPL-MCNC: 160 MG/DL — HIGH (ref 10–149)
TRIGL SERPL-MCNC: 280 MG/DL — HIGH (ref 10–149)
TROPONIN I SERPL-MCNC: <0.015 NG/ML — SIGNIFICANT CHANGE UP (ref 0–0.04)
TSH SERPL-MCNC: 0.87 UU/ML — SIGNIFICANT CHANGE UP (ref 0.34–4.82)
TSH SERPL-MCNC: 0.92 UU/ML — SIGNIFICANT CHANGE UP (ref 0.34–4.82)
UROBILINOGEN FLD QL: NEGATIVE — SIGNIFICANT CHANGE UP
VARIANT LYMPHS # BLD: 4 % — SIGNIFICANT CHANGE UP (ref 0–6)
VIT B12 SERPL-MCNC: 309 PG/ML — SIGNIFICANT CHANGE UP (ref 243–894)
VIT B12 SERPL-MCNC: 451 PG/ML — SIGNIFICANT CHANGE UP (ref 243–894)
WBC # BLD: 10.1 K/UL — SIGNIFICANT CHANGE UP (ref 3.8–10.5)
WBC # BLD: 11.7 K/UL — HIGH (ref 3.8–10.5)
WBC # BLD: 13.4 K/UL — HIGH (ref 3.8–10.5)
WBC # BLD: 16.3 K/UL — HIGH (ref 3.8–10.5)
WBC # BLD: 20.2 K/UL — HIGH (ref 3.8–10.5)
WBC # BLD: 20.5 K/UL — HIGH (ref 3.8–10.5)
WBC # BLD: 21 K/UL — HIGH (ref 3.8–10.5)
WBC # BLD: 21.8 K/UL — HIGH (ref 3.8–10.5)
WBC # BLD: 25.7 K/UL — HIGH (ref 3.8–10.5)
WBC # FLD AUTO: 10.1 K/UL — SIGNIFICANT CHANGE UP (ref 3.8–10.5)
WBC # FLD AUTO: 11.7 K/UL — HIGH (ref 3.8–10.5)
WBC # FLD AUTO: 13.4 K/UL — HIGH (ref 3.8–10.5)
WBC # FLD AUTO: 16.3 K/UL — HIGH (ref 3.8–10.5)
WBC # FLD AUTO: 20.2 K/UL — HIGH (ref 3.8–10.5)
WBC # FLD AUTO: 20.5 K/UL — HIGH (ref 3.8–10.5)
WBC # FLD AUTO: 21 K/UL — HIGH (ref 3.8–10.5)
WBC # FLD AUTO: 21.8 K/UL — HIGH (ref 3.8–10.5)
WBC # FLD AUTO: 25.7 K/UL — HIGH (ref 3.8–10.5)

## 2017-01-01 PROCEDURE — 96375 TX/PRO/DX INJ NEW DRUG ADDON: CPT

## 2017-01-01 PROCEDURE — 82550 ASSAY OF CK (CPK): CPT

## 2017-01-01 PROCEDURE — 87186 SC STD MICRODIL/AGAR DIL: CPT

## 2017-01-01 PROCEDURE — 81001 URINALYSIS AUTO W/SCOPE: CPT

## 2017-01-01 PROCEDURE — 83735 ASSAY OF MAGNESIUM: CPT

## 2017-01-01 PROCEDURE — 71045 X-RAY EXAM CHEST 1 VIEW: CPT

## 2017-01-01 PROCEDURE — 84145 PROCALCITONIN (PCT): CPT

## 2017-01-01 PROCEDURE — 87086 URINE CULTURE/COLONY COUNT: CPT

## 2017-01-01 PROCEDURE — 87070 CULTURE OTHR SPECIMN AEROBIC: CPT

## 2017-01-01 PROCEDURE — 82607 VITAMIN B-12: CPT

## 2017-01-01 PROCEDURE — 87040 BLOOD CULTURE FOR BACTERIA: CPT

## 2017-01-01 PROCEDURE — 82306 VITAMIN D 25 HYDROXY: CPT

## 2017-01-01 PROCEDURE — 84100 ASSAY OF PHOSPHORUS: CPT

## 2017-01-01 PROCEDURE — 94640 AIRWAY INHALATION TREATMENT: CPT

## 2017-01-01 PROCEDURE — 83880 ASSAY OF NATRIURETIC PEPTIDE: CPT

## 2017-01-01 PROCEDURE — 80061 LIPID PANEL: CPT

## 2017-01-01 PROCEDURE — 82553 CREATINE MB FRACTION: CPT

## 2017-01-01 PROCEDURE — 71010: CPT | Mod: 26

## 2017-01-01 PROCEDURE — 85027 COMPLETE CBC AUTOMATED: CPT

## 2017-01-01 PROCEDURE — 82248 BILIRUBIN DIRECT: CPT

## 2017-01-01 PROCEDURE — 94760 N-INVAS EAR/PLS OXIMETRY 1: CPT

## 2017-01-01 PROCEDURE — 83605 ASSAY OF LACTIC ACID: CPT

## 2017-01-01 PROCEDURE — 96374 THER/PROPH/DIAG INJ IV PUSH: CPT

## 2017-01-01 PROCEDURE — 87581 M.PNEUMON DNA AMP PROBE: CPT

## 2017-01-01 PROCEDURE — 84484 ASSAY OF TROPONIN QUANT: CPT

## 2017-01-01 PROCEDURE — 99291 CRITICAL CARE FIRST HOUR: CPT

## 2017-01-01 PROCEDURE — 99285 EMERGENCY DEPT VISIT HI MDM: CPT | Mod: 25

## 2017-01-01 PROCEDURE — 99291 CRITICAL CARE FIRST HOUR: CPT | Mod: 25

## 2017-01-01 PROCEDURE — 84443 ASSAY THYROID STIM HORMONE: CPT

## 2017-01-01 PROCEDURE — 85610 PROTHROMBIN TIME: CPT

## 2017-01-01 PROCEDURE — 80053 COMPREHEN METABOLIC PANEL: CPT

## 2017-01-01 PROCEDURE — 83036 HEMOGLOBIN GLYCOSYLATED A1C: CPT

## 2017-01-01 PROCEDURE — 99222 1ST HOSP IP/OBS MODERATE 55: CPT

## 2017-01-01 PROCEDURE — 87798 DETECT AGENT NOS DNA AMP: CPT

## 2017-01-01 PROCEDURE — 87400 INFLUENZA A/B EACH AG IA: CPT

## 2017-01-01 PROCEDURE — 87486 CHLMYD PNEUM DNA AMP PROBE: CPT

## 2017-01-01 PROCEDURE — 87633 RESP VIRUS 12-25 TARGETS: CPT

## 2017-01-01 PROCEDURE — 82746 ASSAY OF FOLIC ACID SERUM: CPT

## 2017-01-01 PROCEDURE — 99285 EMERGENCY DEPT VISIT HI MDM: CPT

## 2017-01-01 PROCEDURE — 80076 HEPATIC FUNCTION PANEL: CPT

## 2017-01-01 PROCEDURE — 80048 BASIC METABOLIC PNL TOTAL CA: CPT

## 2017-01-01 PROCEDURE — 82803 BLOOD GASES ANY COMBINATION: CPT

## 2017-01-01 PROCEDURE — 85730 THROMBOPLASTIN TIME PARTIAL: CPT

## 2017-01-01 PROCEDURE — 93005 ELECTROCARDIOGRAM TRACING: CPT

## 2017-01-01 RX ORDER — DEXTROSE 50 % IN WATER 50 %
1 SYRINGE (ML) INTRAVENOUS ONCE
Qty: 0 | Refills: 0 | Status: DISCONTINUED | OUTPATIENT
Start: 2017-01-01 | End: 2017-01-01

## 2017-01-01 RX ORDER — SODIUM CHLORIDE 9 MG/ML
1000 INJECTION, SOLUTION INTRAVENOUS
Qty: 0 | Refills: 0 | Status: DISCONTINUED | OUTPATIENT
Start: 2017-01-01 | End: 2017-01-01

## 2017-01-01 RX ORDER — ACETYLCYSTEINE 200 MG/ML
4 VIAL (ML) MISCELLANEOUS EVERY 6 HOURS
Qty: 0 | Refills: 0 | Status: DISCONTINUED | OUTPATIENT
Start: 2017-01-01 | End: 2017-01-01

## 2017-01-01 RX ORDER — HEPARIN SODIUM 5000 [USP'U]/ML
5000 INJECTION INTRAVENOUS; SUBCUTANEOUS EVERY 8 HOURS
Qty: 0 | Refills: 0 | Status: DISCONTINUED | OUTPATIENT
Start: 2017-01-01 | End: 2017-01-01

## 2017-01-01 RX ORDER — PIPERACILLIN AND TAZOBACTAM 4; .5 G/20ML; G/20ML
3.38 INJECTION, POWDER, LYOPHILIZED, FOR SOLUTION INTRAVENOUS ONCE
Qty: 0 | Refills: 0 | Status: COMPLETED | OUTPATIENT
Start: 2017-01-01 | End: 2017-01-01

## 2017-01-01 RX ORDER — CEFEPIME 1 G/1
1000 INJECTION, POWDER, FOR SOLUTION INTRAMUSCULAR; INTRAVENOUS EVERY 8 HOURS
Qty: 0 | Refills: 0 | Status: DISCONTINUED | OUTPATIENT
Start: 2017-01-01 | End: 2017-01-01

## 2017-01-01 RX ORDER — PANTOPRAZOLE SODIUM 20 MG/1
40 TABLET, DELAYED RELEASE ORAL
Qty: 0 | Refills: 0 | Status: DISCONTINUED | OUTPATIENT
Start: 2017-01-01 | End: 2017-01-01

## 2017-01-01 RX ORDER — MIDODRINE HYDROCHLORIDE 2.5 MG/1
5 TABLET ORAL ONCE
Qty: 0 | Refills: 0 | Status: COMPLETED | OUTPATIENT
Start: 2017-01-01 | End: 2017-01-01

## 2017-01-01 RX ORDER — PIPERACILLIN AND TAZOBACTAM 4; .5 G/20ML; G/20ML
3.38 INJECTION, POWDER, LYOPHILIZED, FOR SOLUTION INTRAVENOUS ONCE
Qty: 0 | Refills: 0 | Status: DISCONTINUED | OUTPATIENT
Start: 2017-01-01 | End: 2017-01-01

## 2017-01-01 RX ORDER — ONDANSETRON 8 MG/1
4 TABLET, FILM COATED ORAL ONCE
Qty: 0 | Refills: 0 | Status: COMPLETED | OUTPATIENT
Start: 2017-01-01 | End: 2017-01-01

## 2017-01-01 RX ORDER — METOPROLOL TARTRATE 50 MG
50 TABLET ORAL
Qty: 0 | Refills: 0 | Status: DISCONTINUED | OUTPATIENT
Start: 2017-01-01 | End: 2017-01-01

## 2017-01-01 RX ORDER — METRONIDAZOLE 500 MG
500 TABLET ORAL ONCE
Qty: 0 | Refills: 0 | Status: COMPLETED | OUTPATIENT
Start: 2017-01-01 | End: 2017-01-01

## 2017-01-01 RX ORDER — PRIMIDONE 250 MG/1
250 TABLET ORAL THREE TIMES A DAY
Qty: 0 | Refills: 0 | Status: DISCONTINUED | OUTPATIENT
Start: 2017-01-01 | End: 2017-01-01

## 2017-01-01 RX ORDER — POTASSIUM CHLORIDE 20 MEQ
40 PACKET (EA) ORAL
Qty: 0 | Refills: 0 | Status: DISCONTINUED | OUTPATIENT
Start: 2017-01-01 | End: 2017-01-01

## 2017-01-01 RX ORDER — FOLIC ACID 0.8 MG
1 TABLET ORAL DAILY
Qty: 0 | Refills: 0 | Status: DISCONTINUED | OUTPATIENT
Start: 2017-01-01 | End: 2017-01-01

## 2017-01-01 RX ORDER — DEXTROSE 50 % IN WATER 50 %
25 SYRINGE (ML) INTRAVENOUS ONCE
Qty: 0 | Refills: 0 | Status: DISCONTINUED | OUTPATIENT
Start: 2017-01-01 | End: 2017-01-01

## 2017-01-01 RX ORDER — METRONIDAZOLE 500 MG
500 TABLET ORAL EVERY 8 HOURS
Qty: 0 | Refills: 0 | Status: DISCONTINUED | OUTPATIENT
Start: 2017-01-01 | End: 2017-01-01

## 2017-01-01 RX ORDER — POTASSIUM PHOSPHATE, MONOBASIC POTASSIUM PHOSPHATE, DIBASIC 236; 224 MG/ML; MG/ML
15 INJECTION, SOLUTION INTRAVENOUS ONCE
Qty: 0 | Refills: 0 | Status: COMPLETED | OUTPATIENT
Start: 2017-01-01 | End: 2017-01-01

## 2017-01-01 RX ORDER — VANCOMYCIN HCL 1 G
VIAL (EA) INTRAVENOUS
Qty: 0 | Refills: 0 | Status: DISCONTINUED | OUTPATIENT
Start: 2017-01-01 | End: 2017-01-01

## 2017-01-01 RX ORDER — POTASSIUM CHLORIDE 20 MEQ
40 PACKET (EA) ORAL EVERY 4 HOURS
Qty: 0 | Refills: 0 | Status: COMPLETED | OUTPATIENT
Start: 2017-01-01 | End: 2017-01-01

## 2017-01-01 RX ORDER — METOPROLOL TARTRATE 50 MG
25 TABLET ORAL
Qty: 0 | Refills: 0 | Status: DISCONTINUED | OUTPATIENT
Start: 2017-01-01 | End: 2017-01-01

## 2017-01-01 RX ORDER — PRIMIDONE 250 MG/1
1 TABLET ORAL
Qty: 0 | Refills: 0 | COMMUNITY

## 2017-01-01 RX ORDER — POTASSIUM CHLORIDE 20 MEQ
40 PACKET (EA) ORAL ONCE
Qty: 0 | Refills: 0 | Status: DISCONTINUED | OUTPATIENT
Start: 2017-01-01 | End: 2017-01-01

## 2017-01-01 RX ORDER — PIPERACILLIN AND TAZOBACTAM 4; .5 G/20ML; G/20ML
3.38 INJECTION, POWDER, LYOPHILIZED, FOR SOLUTION INTRAVENOUS EVERY 8 HOURS
Qty: 0 | Refills: 0 | Status: DISCONTINUED | OUTPATIENT
Start: 2017-01-01 | End: 2017-01-01

## 2017-01-01 RX ORDER — PROPRANOLOL HCL 160 MG
1 CAPSULE, EXTENDED RELEASE 24HR ORAL
Qty: 0 | Refills: 0 | COMMUNITY

## 2017-01-01 RX ORDER — ASCORBIC ACID 60 MG
500 TABLET,CHEWABLE ORAL DAILY
Qty: 0 | Refills: 0 | Status: DISCONTINUED | OUTPATIENT
Start: 2017-01-01 | End: 2017-01-01

## 2017-01-01 RX ORDER — ONDANSETRON 8 MG/1
4 TABLET, FILM COATED ORAL EVERY 8 HOURS
Qty: 0 | Refills: 0 | Status: DISCONTINUED | OUTPATIENT
Start: 2017-01-01 | End: 2017-01-01

## 2017-01-01 RX ORDER — ACETAMINOPHEN 500 MG
650 TABLET ORAL ONCE
Qty: 0 | Refills: 0 | Status: COMPLETED | OUTPATIENT
Start: 2017-01-01 | End: 2017-01-01

## 2017-01-01 RX ORDER — ACETAMINOPHEN 500 MG
650 TABLET ORAL EVERY 6 HOURS
Qty: 0 | Refills: 0 | Status: DISCONTINUED | OUTPATIENT
Start: 2017-01-01 | End: 2017-01-01

## 2017-01-01 RX ORDER — GLUCAGON INJECTION, SOLUTION 0.5 MG/.1ML
1 INJECTION, SOLUTION SUBCUTANEOUS ONCE
Qty: 0 | Refills: 0 | Status: DISCONTINUED | OUTPATIENT
Start: 2017-01-01 | End: 2017-01-01

## 2017-01-01 RX ORDER — CEFTAZIDIME 6 G/30ML
1 INJECTION, POWDER, FOR SOLUTION INTRAVENOUS ONCE
Qty: 0 | Refills: 0 | Status: COMPLETED | OUTPATIENT
Start: 2017-01-01 | End: 2017-01-01

## 2017-01-01 RX ORDER — LEVETIRACETAM 250 MG/1
15 TABLET, FILM COATED ORAL
Qty: 0 | Refills: 0 | COMMUNITY

## 2017-01-01 RX ORDER — CEFEPIME 1 G/1
INJECTION, POWDER, FOR SOLUTION INTRAMUSCULAR; INTRAVENOUS
Qty: 0 | Refills: 0 | Status: DISCONTINUED | OUTPATIENT
Start: 2017-01-01 | End: 2017-01-01

## 2017-01-01 RX ORDER — VANCOMYCIN HCL 1 G
1000 VIAL (EA) INTRAVENOUS ONCE
Qty: 0 | Refills: 0 | Status: COMPLETED | OUTPATIENT
Start: 2017-01-01 | End: 2017-01-01

## 2017-01-01 RX ORDER — LEVETIRACETAM 250 MG/1
1250 TABLET, FILM COATED ORAL AT BEDTIME
Qty: 0 | Refills: 0 | Status: DISCONTINUED | OUTPATIENT
Start: 2017-01-01 | End: 2017-01-01

## 2017-01-01 RX ORDER — ENOXAPARIN SODIUM 100 MG/ML
40 INJECTION SUBCUTANEOUS DAILY
Qty: 0 | Refills: 0 | Status: DISCONTINUED | OUTPATIENT
Start: 2017-01-01 | End: 2017-01-01

## 2017-01-01 RX ORDER — LEVETIRACETAM 250 MG/1
1.5 TABLET, FILM COATED ORAL
Qty: 0 | Refills: 0 | COMMUNITY

## 2017-01-01 RX ORDER — VANCOMYCIN HCL 1 G
1000 VIAL (EA) INTRAVENOUS EVERY 12 HOURS
Qty: 0 | Refills: 0 | Status: DISCONTINUED | OUTPATIENT
Start: 2017-01-01 | End: 2017-01-01

## 2017-01-01 RX ORDER — METRONIDAZOLE 500 MG
TABLET ORAL
Qty: 0 | Refills: 0 | Status: DISCONTINUED | OUTPATIENT
Start: 2017-01-01 | End: 2017-01-01

## 2017-01-01 RX ORDER — IPRATROPIUM/ALBUTEROL SULFATE 18-103MCG
3 AEROSOL WITH ADAPTER (GRAM) INHALATION EVERY 6 HOURS
Qty: 0 | Refills: 0 | Status: DISCONTINUED | OUTPATIENT
Start: 2017-01-01 | End: 2017-01-01

## 2017-01-01 RX ORDER — LEVETIRACETAM 250 MG/1
12.5 TABLET, FILM COATED ORAL
Qty: 0 | Refills: 0 | COMMUNITY

## 2017-01-01 RX ORDER — POTASSIUM CHLORIDE 20 MEQ
40 PACKET (EA) ORAL EVERY 4 HOURS
Qty: 0 | Refills: 0 | Status: DISCONTINUED | OUTPATIENT
Start: 2017-01-01 | End: 2017-01-01

## 2017-01-01 RX ORDER — PANTOPRAZOLE SODIUM 20 MG/1
40 TABLET, DELAYED RELEASE ORAL DAILY
Qty: 0 | Refills: 0 | Status: DISCONTINUED | OUTPATIENT
Start: 2017-01-01 | End: 2017-01-01

## 2017-01-01 RX ORDER — CEFEPIME 1 G/1
2000 INJECTION, POWDER, FOR SOLUTION INTRAMUSCULAR; INTRAVENOUS ONCE
Qty: 0 | Refills: 0 | Status: COMPLETED | OUTPATIENT
Start: 2017-01-01 | End: 2017-01-01

## 2017-01-01 RX ORDER — SODIUM CHLORIDE 9 MG/ML
1950 INJECTION INTRAMUSCULAR; INTRAVENOUS; SUBCUTANEOUS ONCE
Qty: 0 | Refills: 0 | Status: COMPLETED | OUTPATIENT
Start: 2017-01-01 | End: 2017-01-01

## 2017-01-01 RX ORDER — CEFTAZIDIME 6 G/30ML
1 INJECTION, POWDER, FOR SOLUTION INTRAVENOUS EVERY 8 HOURS
Qty: 0 | Refills: 0 | Status: DISCONTINUED | OUTPATIENT
Start: 2017-01-01 | End: 2017-01-01

## 2017-01-01 RX ORDER — INSULIN LISPRO 100/ML
VIAL (ML) SUBCUTANEOUS
Qty: 0 | Refills: 0 | Status: DISCONTINUED | OUTPATIENT
Start: 2017-01-01 | End: 2017-01-01

## 2017-01-01 RX ORDER — CEFTAZIDIME 6 G/30ML
INJECTION, POWDER, FOR SOLUTION INTRAVENOUS
Qty: 0 | Refills: 0 | Status: DISCONTINUED | OUTPATIENT
Start: 2017-01-01 | End: 2017-01-01

## 2017-01-01 RX ORDER — LEVETIRACETAM 250 MG/1
1500 TABLET, FILM COATED ORAL DAILY
Qty: 0 | Refills: 0 | Status: DISCONTINUED | OUTPATIENT
Start: 2017-01-01 | End: 2017-01-01

## 2017-01-01 RX ORDER — CEFEPIME 1 G/1
2000 INJECTION, POWDER, FOR SOLUTION INTRAMUSCULAR; INTRAVENOUS EVERY 8 HOURS
Qty: 0 | Refills: 0 | Status: DISCONTINUED | OUTPATIENT
Start: 2017-01-01 | End: 2017-01-01

## 2017-01-01 RX ORDER — SODIUM CHLORIDE 9 MG/ML
1000 INJECTION INTRAMUSCULAR; INTRAVENOUS; SUBCUTANEOUS ONCE
Qty: 0 | Refills: 0 | Status: COMPLETED | OUTPATIENT
Start: 2017-01-01 | End: 2017-01-01

## 2017-01-01 RX ORDER — DEXTROSE 50 % IN WATER 50 %
12.5 SYRINGE (ML) INTRAVENOUS ONCE
Qty: 0 | Refills: 0 | Status: DISCONTINUED | OUTPATIENT
Start: 2017-01-01 | End: 2017-01-01

## 2017-01-01 RX ADMIN — Medication 1 DROP(S): at 12:46

## 2017-01-01 RX ADMIN — PRIMIDONE 250 MILLIGRAM(S): 250 TABLET ORAL at 06:44

## 2017-01-01 RX ADMIN — Medication 3 MILLILITER(S): at 21:07

## 2017-01-01 RX ADMIN — LEVETIRACETAM 1250 MILLIGRAM(S): 250 TABLET, FILM COATED ORAL at 23:17

## 2017-01-01 RX ADMIN — PRIMIDONE 250 MILLIGRAM(S): 250 TABLET ORAL at 13:58

## 2017-01-01 RX ADMIN — HEPARIN SODIUM 5000 UNIT(S): 5000 INJECTION INTRAVENOUS; SUBCUTANEOUS at 06:12

## 2017-01-01 RX ADMIN — LEVETIRACETAM 1250 MILLIGRAM(S): 250 TABLET, FILM COATED ORAL at 00:29

## 2017-01-01 RX ADMIN — Medication 20 MILLIGRAM(S): at 21:47

## 2017-01-01 RX ADMIN — PANTOPRAZOLE SODIUM 40 MILLIGRAM(S): 20 TABLET, DELAYED RELEASE ORAL at 11:53

## 2017-01-01 RX ADMIN — Medication 25 MILLIGRAM(S): at 05:12

## 2017-01-01 RX ADMIN — LEVETIRACETAM 1250 MILLIGRAM(S): 250 TABLET, FILM COATED ORAL at 21:58

## 2017-01-01 RX ADMIN — Medication 40 MILLIGRAM(S): at 05:41

## 2017-01-01 RX ADMIN — PANTOPRAZOLE SODIUM 40 MILLIGRAM(S): 20 TABLET, DELAYED RELEASE ORAL at 13:16

## 2017-01-01 RX ADMIN — Medication 20 MILLIGRAM(S): at 09:26

## 2017-01-01 RX ADMIN — PIPERACILLIN AND TAZOBACTAM 25 GRAM(S): 4; .5 INJECTION, POWDER, LYOPHILIZED, FOR SOLUTION INTRAVENOUS at 13:49

## 2017-01-01 RX ADMIN — PRIMIDONE 250 MILLIGRAM(S): 250 TABLET ORAL at 13:43

## 2017-01-01 RX ADMIN — LEVETIRACETAM 1500 MILLIGRAM(S): 250 TABLET, FILM COATED ORAL at 13:22

## 2017-01-01 RX ADMIN — Medication 3 MILLILITER(S): at 08:47

## 2017-01-01 RX ADMIN — Medication 4 MILLILITER(S): at 02:03

## 2017-01-01 RX ADMIN — Medication 20 MILLIGRAM(S): at 13:20

## 2017-01-01 RX ADMIN — Medication 3 MILLILITER(S): at 20:11

## 2017-01-01 RX ADMIN — CEFEPIME 100 MILLIGRAM(S): 1 INJECTION, POWDER, FOR SOLUTION INTRAMUSCULAR; INTRAVENOUS at 13:53

## 2017-01-01 RX ADMIN — Medication 25 MILLIGRAM(S): at 05:58

## 2017-01-01 RX ADMIN — PIPERACILLIN AND TAZOBACTAM 25 GRAM(S): 4; .5 INJECTION, POWDER, LYOPHILIZED, FOR SOLUTION INTRAVENOUS at 22:17

## 2017-01-01 RX ADMIN — PRIMIDONE 250 MILLIGRAM(S): 250 TABLET ORAL at 13:53

## 2017-01-01 RX ADMIN — PRIMIDONE 250 MILLIGRAM(S): 250 TABLET ORAL at 15:01

## 2017-01-01 RX ADMIN — LEVETIRACETAM 500 MILLIGRAM(S): 250 TABLET, FILM COATED ORAL at 21:50

## 2017-01-01 RX ADMIN — Medication 1 DROP(S): at 13:20

## 2017-01-01 RX ADMIN — PIPERACILLIN AND TAZOBACTAM 25 GRAM(S): 4; .5 INJECTION, POWDER, LYOPHILIZED, FOR SOLUTION INTRAVENOUS at 13:44

## 2017-01-01 RX ADMIN — PIPERACILLIN AND TAZOBACTAM 25 GRAM(S): 4; .5 INJECTION, POWDER, LYOPHILIZED, FOR SOLUTION INTRAVENOUS at 13:15

## 2017-01-01 RX ADMIN — SODIUM CHLORIDE 100 MILLILITER(S): 9 INJECTION, SOLUTION INTRAVENOUS at 13:16

## 2017-01-01 RX ADMIN — PRIMIDONE 250 MILLIGRAM(S): 250 TABLET ORAL at 06:13

## 2017-01-01 RX ADMIN — Medication 3 MILLILITER(S): at 14:49

## 2017-01-01 RX ADMIN — Medication 500 MILLIGRAM(S): at 13:21

## 2017-01-01 RX ADMIN — Medication 40 MILLIEQUIVALENT(S): at 21:49

## 2017-01-01 RX ADMIN — Medication 25 MILLIGRAM(S): at 18:39

## 2017-01-01 RX ADMIN — PIPERACILLIN AND TAZOBACTAM 25 GRAM(S): 4; .5 INJECTION, POWDER, LYOPHILIZED, FOR SOLUTION INTRAVENOUS at 14:07

## 2017-01-01 RX ADMIN — Medication 500 MILLIGRAM(S): at 12:16

## 2017-01-01 RX ADMIN — Medication 20 MILLIGRAM(S): at 05:11

## 2017-01-01 RX ADMIN — Medication 650 MILLIGRAM(S): at 01:30

## 2017-01-01 RX ADMIN — Medication 1 TABLET(S): at 14:10

## 2017-01-01 RX ADMIN — PRIMIDONE 250 MILLIGRAM(S): 250 TABLET ORAL at 18:03

## 2017-01-01 RX ADMIN — HEPARIN SODIUM 5000 UNIT(S): 5000 INJECTION INTRAVENOUS; SUBCUTANEOUS at 07:29

## 2017-01-01 RX ADMIN — POTASSIUM PHOSPHATE, MONOBASIC POTASSIUM PHOSPHATE, DIBASIC 62.5 MILLIMOLE(S): 236; 224 INJECTION, SOLUTION INTRAVENOUS at 13:56

## 2017-01-01 RX ADMIN — LEVETIRACETAM 1500 MILLIGRAM(S): 250 TABLET, FILM COATED ORAL at 11:29

## 2017-01-01 RX ADMIN — Medication 1 DROP(S): at 06:43

## 2017-01-01 RX ADMIN — Medication 40 MILLIGRAM(S): at 18:38

## 2017-01-01 RX ADMIN — Medication 1 TABLET(S): at 11:02

## 2017-01-01 RX ADMIN — Medication 50 MILLIGRAM(S): at 05:38

## 2017-01-01 RX ADMIN — Medication 1 MILLIGRAM(S): at 12:46

## 2017-01-01 RX ADMIN — LEVETIRACETAM 1500 MILLIGRAM(S): 250 TABLET, FILM COATED ORAL at 13:06

## 2017-01-01 RX ADMIN — LEVETIRACETAM 1250 MILLIGRAM(S): 250 TABLET, FILM COATED ORAL at 22:43

## 2017-01-01 RX ADMIN — Medication 1 TABLET(S): at 11:45

## 2017-01-01 RX ADMIN — PANTOPRAZOLE SODIUM 40 MILLIGRAM(S): 20 TABLET, DELAYED RELEASE ORAL at 13:43

## 2017-01-01 RX ADMIN — LEVETIRACETAM 1500 MILLIGRAM(S): 250 TABLET, FILM COATED ORAL at 11:02

## 2017-01-01 RX ADMIN — Medication 500 MILLIGRAM(S): at 12:45

## 2017-01-01 RX ADMIN — Medication 250 MILLIGRAM(S): at 17:26

## 2017-01-01 RX ADMIN — Medication 20 MILLIGRAM(S): at 13:44

## 2017-01-01 RX ADMIN — PRIMIDONE 250 MILLIGRAM(S): 250 TABLET ORAL at 13:21

## 2017-01-01 RX ADMIN — POTASSIUM PHOSPHATE, MONOBASIC POTASSIUM PHOSPHATE, DIBASIC 62.5 MILLIMOLE(S): 236; 224 INJECTION, SOLUTION INTRAVENOUS at 11:15

## 2017-01-01 RX ADMIN — SODIUM CHLORIDE 100 MILLILITER(S): 9 INJECTION, SOLUTION INTRAVENOUS at 17:56

## 2017-01-01 RX ADMIN — Medication 3 MILLILITER(S): at 21:31

## 2017-01-01 RX ADMIN — Medication 20 MILLIGRAM(S): at 21:45

## 2017-01-01 RX ADMIN — Medication 500 MILLIGRAM(S): at 11:27

## 2017-01-01 RX ADMIN — HEPARIN SODIUM 5000 UNIT(S): 5000 INJECTION INTRAVENOUS; SUBCUTANEOUS at 22:17

## 2017-01-01 RX ADMIN — CEFEPIME 100 MILLIGRAM(S): 1 INJECTION, POWDER, FOR SOLUTION INTRAMUSCULAR; INTRAVENOUS at 05:20

## 2017-01-01 RX ADMIN — Medication 1 TABLET(S): at 13:15

## 2017-01-01 RX ADMIN — Medication 20 MILLIGRAM(S): at 21:57

## 2017-01-01 RX ADMIN — Medication 25 MILLIGRAM(S): at 18:23

## 2017-01-01 RX ADMIN — Medication 100 MILLIGRAM(S): at 13:53

## 2017-01-01 RX ADMIN — Medication 25 MILLIGRAM(S): at 05:48

## 2017-01-01 RX ADMIN — HEPARIN SODIUM 5000 UNIT(S): 5000 INJECTION INTRAVENOUS; SUBCUTANEOUS at 05:11

## 2017-01-01 RX ADMIN — HEPARIN SODIUM 5000 UNIT(S): 5000 INJECTION INTRAVENOUS; SUBCUTANEOUS at 13:16

## 2017-01-01 RX ADMIN — Medication 4 MILLILITER(S): at 21:36

## 2017-01-01 RX ADMIN — PRIMIDONE 250 MILLIGRAM(S): 250 TABLET ORAL at 00:27

## 2017-01-01 RX ADMIN — Medication 20 MILLIGRAM(S): at 22:17

## 2017-01-01 RX ADMIN — Medication 40 MILLIGRAM(S): at 21:52

## 2017-01-01 RX ADMIN — Medication 20 MILLIGRAM(S): at 13:34

## 2017-01-01 RX ADMIN — Medication 40 MILLIGRAM(S): at 15:01

## 2017-01-01 RX ADMIN — Medication 500 MILLIGRAM(S): at 11:45

## 2017-01-01 RX ADMIN — Medication 3 MILLILITER(S): at 09:42

## 2017-01-01 RX ADMIN — Medication 3 MILLILITER(S): at 21:39

## 2017-01-01 RX ADMIN — LEVETIRACETAM 1250 MILLIGRAM(S): 250 TABLET, FILM COATED ORAL at 22:17

## 2017-01-01 RX ADMIN — PIPERACILLIN AND TAZOBACTAM 25 GRAM(S): 4; .5 INJECTION, POWDER, LYOPHILIZED, FOR SOLUTION INTRAVENOUS at 21:47

## 2017-01-01 RX ADMIN — Medication 25 MILLIGRAM(S): at 17:33

## 2017-01-01 RX ADMIN — Medication 1 TABLET(S): at 11:53

## 2017-01-01 RX ADMIN — Medication 20 MILLIGRAM(S): at 05:26

## 2017-01-01 RX ADMIN — LEVETIRACETAM 1500 MILLIGRAM(S): 250 TABLET, FILM COATED ORAL at 12:16

## 2017-01-01 RX ADMIN — Medication 50 MILLIGRAM(S): at 18:05

## 2017-01-01 RX ADMIN — PRIMIDONE 250 MILLIGRAM(S): 250 TABLET ORAL at 05:48

## 2017-01-01 RX ADMIN — HEPARIN SODIUM 5000 UNIT(S): 5000 INJECTION INTRAVENOUS; SUBCUTANEOUS at 13:22

## 2017-01-01 RX ADMIN — HEPARIN SODIUM 5000 UNIT(S): 5000 INJECTION INTRAVENOUS; SUBCUTANEOUS at 21:42

## 2017-01-01 RX ADMIN — Medication 500 MILLIGRAM(S): at 11:01

## 2017-01-01 RX ADMIN — PIPERACILLIN AND TAZOBACTAM 25 GRAM(S): 4; .5 INJECTION, POWDER, LYOPHILIZED, FOR SOLUTION INTRAVENOUS at 06:12

## 2017-01-01 RX ADMIN — HEPARIN SODIUM 5000 UNIT(S): 5000 INJECTION INTRAVENOUS; SUBCUTANEOUS at 06:35

## 2017-01-01 RX ADMIN — HEPARIN SODIUM 5000 UNIT(S): 5000 INJECTION INTRAVENOUS; SUBCUTANEOUS at 05:47

## 2017-01-01 RX ADMIN — PIPERACILLIN AND TAZOBACTAM 25 GRAM(S): 4; .5 INJECTION, POWDER, LYOPHILIZED, FOR SOLUTION INTRAVENOUS at 06:35

## 2017-01-01 RX ADMIN — SODIUM CHLORIDE 1950 MILLILITER(S): 9 INJECTION INTRAMUSCULAR; INTRAVENOUS; SUBCUTANEOUS at 10:00

## 2017-01-01 RX ADMIN — PRIMIDONE 250 MILLIGRAM(S): 250 TABLET ORAL at 21:46

## 2017-01-01 RX ADMIN — Medication 3 MILLILITER(S): at 10:32

## 2017-01-01 RX ADMIN — Medication 3 MILLILITER(S): at 14:58

## 2017-01-01 RX ADMIN — PIPERACILLIN AND TAZOBACTAM 25 GRAM(S): 4; .5 INJECTION, POWDER, LYOPHILIZED, FOR SOLUTION INTRAVENOUS at 22:16

## 2017-01-01 RX ADMIN — LEVETIRACETAM 1500 MILLIGRAM(S): 250 TABLET, FILM COATED ORAL at 13:43

## 2017-01-01 RX ADMIN — Medication 1 DROP(S): at 11:53

## 2017-01-01 RX ADMIN — PIPERACILLIN AND TAZOBACTAM 25 GRAM(S): 4; .5 INJECTION, POWDER, LYOPHILIZED, FOR SOLUTION INTRAVENOUS at 13:19

## 2017-01-01 RX ADMIN — ENOXAPARIN SODIUM 40 MILLIGRAM(S): 100 INJECTION SUBCUTANEOUS at 14:10

## 2017-01-01 RX ADMIN — CEFTAZIDIME 107.2 GRAM(S): 6 INJECTION, POWDER, FOR SOLUTION INTRAVENOUS at 05:40

## 2017-01-01 RX ADMIN — LEVETIRACETAM 1500 MILLIGRAM(S): 250 TABLET, FILM COATED ORAL at 11:46

## 2017-01-01 RX ADMIN — LEVETIRACETAM 1250 MILLIGRAM(S): 250 TABLET, FILM COATED ORAL at 21:41

## 2017-01-01 RX ADMIN — Medication 3 MILLILITER(S): at 09:57

## 2017-01-01 RX ADMIN — Medication 650 MILLIGRAM(S): at 02:35

## 2017-01-01 RX ADMIN — Medication 1 DROP(S): at 12:17

## 2017-01-01 RX ADMIN — Medication 20 MILLIGRAM(S): at 13:43

## 2017-01-01 RX ADMIN — LEVETIRACETAM 1500 MILLIGRAM(S): 250 TABLET, FILM COATED ORAL at 13:42

## 2017-01-01 RX ADMIN — Medication 1 DROP(S): at 11:45

## 2017-01-01 RX ADMIN — Medication 40 MILLIGRAM(S): at 13:57

## 2017-01-01 RX ADMIN — PANTOPRAZOLE SODIUM 40 MILLIGRAM(S): 20 TABLET, DELAYED RELEASE ORAL at 11:29

## 2017-01-01 RX ADMIN — PIPERACILLIN AND TAZOBACTAM 25 GRAM(S): 4; .5 INJECTION, POWDER, LYOPHILIZED, FOR SOLUTION INTRAVENOUS at 22:44

## 2017-01-01 RX ADMIN — Medication 1 TABLET(S): at 12:46

## 2017-01-01 RX ADMIN — Medication 3 MILLILITER(S): at 14:26

## 2017-01-01 RX ADMIN — Medication 50 MILLIGRAM(S): at 18:06

## 2017-01-01 RX ADMIN — HEPARIN SODIUM 5000 UNIT(S): 5000 INJECTION INTRAVENOUS; SUBCUTANEOUS at 13:43

## 2017-01-01 RX ADMIN — Medication 1 TABLET(S): at 14:11

## 2017-01-01 RX ADMIN — HEPARIN SODIUM 5000 UNIT(S): 5000 INJECTION INTRAVENOUS; SUBCUTANEOUS at 21:46

## 2017-01-01 RX ADMIN — Medication 1 MILLIGRAM(S): at 13:21

## 2017-01-01 RX ADMIN — Medication 1 TABLET(S): at 12:16

## 2017-01-01 RX ADMIN — POTASSIUM PHOSPHATE, MONOBASIC POTASSIUM PHOSPHATE, DIBASIC 62.5 MILLIMOLE(S): 236; 224 INJECTION, SOLUTION INTRAVENOUS at 14:11

## 2017-01-01 RX ADMIN — Medication 1 DROP(S): at 11:28

## 2017-01-01 RX ADMIN — PRIMIDONE 250 MILLIGRAM(S): 250 TABLET ORAL at 05:42

## 2017-01-01 RX ADMIN — Medication 25 MILLIGRAM(S): at 18:03

## 2017-01-01 RX ADMIN — HEPARIN SODIUM 5000 UNIT(S): 5000 INJECTION INTRAVENOUS; SUBCUTANEOUS at 13:34

## 2017-01-01 RX ADMIN — Medication 4 MILLILITER(S): at 09:57

## 2017-01-01 RX ADMIN — Medication 25 MILLIGRAM(S): at 07:29

## 2017-01-01 RX ADMIN — Medication 20 MILLIGRAM(S): at 00:28

## 2017-01-01 RX ADMIN — Medication 3 MILLILITER(S): at 15:04

## 2017-01-01 RX ADMIN — PRIMIDONE 250 MILLIGRAM(S): 250 TABLET ORAL at 13:35

## 2017-01-01 RX ADMIN — Medication 1 TABLET(S): at 13:07

## 2017-01-01 RX ADMIN — PANTOPRAZOLE SODIUM 40 MILLIGRAM(S): 20 TABLET, DELAYED RELEASE ORAL at 13:42

## 2017-01-01 RX ADMIN — PRIMIDONE 250 MILLIGRAM(S): 250 TABLET ORAL at 05:20

## 2017-01-01 RX ADMIN — Medication 3 MILLILITER(S): at 02:03

## 2017-01-01 RX ADMIN — Medication 1 DROP(S): at 13:42

## 2017-01-01 RX ADMIN — PIPERACILLIN AND TAZOBACTAM 25 GRAM(S): 4; .5 INJECTION, POWDER, LYOPHILIZED, FOR SOLUTION INTRAVENOUS at 21:59

## 2017-01-01 RX ADMIN — Medication 500 MILLIGRAM(S): at 11:53

## 2017-01-01 RX ADMIN — HEPARIN SODIUM 5000 UNIT(S): 5000 INJECTION INTRAVENOUS; SUBCUTANEOUS at 05:59

## 2017-01-01 RX ADMIN — PIPERACILLIN AND TAZOBACTAM 25 GRAM(S): 4; .5 INJECTION, POWDER, LYOPHILIZED, FOR SOLUTION INTRAVENOUS at 05:59

## 2017-01-01 RX ADMIN — Medication 40 MILLIEQUIVALENT(S): at 18:03

## 2017-01-01 RX ADMIN — Medication 25 MILLIGRAM(S): at 18:53

## 2017-01-01 RX ADMIN — Medication 20 MILLIGRAM(S): at 06:48

## 2017-01-01 RX ADMIN — PRIMIDONE 250 MILLIGRAM(S): 250 TABLET ORAL at 06:35

## 2017-01-01 RX ADMIN — LEVETIRACETAM 1250 MILLIGRAM(S): 250 TABLET, FILM COATED ORAL at 21:45

## 2017-01-01 RX ADMIN — Medication 4 MILLILITER(S): at 15:01

## 2017-01-01 RX ADMIN — Medication 250 MILLIGRAM(S): at 07:49

## 2017-01-01 RX ADMIN — Medication 1 MILLIGRAM(S): at 13:07

## 2017-01-01 RX ADMIN — SODIUM CHLORIDE 100 MILLILITER(S): 9 INJECTION, SOLUTION INTRAVENOUS at 13:36

## 2017-01-01 RX ADMIN — Medication 1 TABLET(S): at 11:38

## 2017-01-01 RX ADMIN — HEPARIN SODIUM 5000 UNIT(S): 5000 INJECTION INTRAVENOUS; SUBCUTANEOUS at 00:28

## 2017-01-01 RX ADMIN — PRIMIDONE 250 MILLIGRAM(S): 250 TABLET ORAL at 05:38

## 2017-01-01 RX ADMIN — PRIMIDONE 250 MILLIGRAM(S): 250 TABLET ORAL at 22:17

## 2017-01-01 RX ADMIN — Medication 100 MILLIGRAM(S): at 05:21

## 2017-01-01 RX ADMIN — ENOXAPARIN SODIUM 40 MILLIGRAM(S): 100 INJECTION SUBCUTANEOUS at 11:02

## 2017-01-01 RX ADMIN — Medication 3 MILLILITER(S): at 02:52

## 2017-01-01 RX ADMIN — PRIMIDONE 250 MILLIGRAM(S): 250 TABLET ORAL at 05:12

## 2017-01-01 RX ADMIN — Medication 650 MILLIGRAM(S): at 21:51

## 2017-01-01 RX ADMIN — Medication 500 MILLIGRAM(S): at 13:07

## 2017-01-01 RX ADMIN — SODIUM CHLORIDE 100 MILLILITER(S): 9 INJECTION, SOLUTION INTRAVENOUS at 06:12

## 2017-01-01 RX ADMIN — PIPERACILLIN AND TAZOBACTAM 200 GRAM(S): 4; .5 INJECTION, POWDER, LYOPHILIZED, FOR SOLUTION INTRAVENOUS at 18:06

## 2017-01-01 RX ADMIN — Medication 1 MILLIGRAM(S): at 11:27

## 2017-01-01 RX ADMIN — Medication 25 MILLIGRAM(S): at 18:16

## 2017-01-01 RX ADMIN — Medication 3 MILLILITER(S): at 21:30

## 2017-01-01 RX ADMIN — CEFTAZIDIME 107.2 GRAM(S): 6 INJECTION, POWDER, FOR SOLUTION INTRAVENOUS at 21:47

## 2017-01-01 RX ADMIN — Medication 1 TABLET(S): at 13:21

## 2017-01-01 RX ADMIN — PIPERACILLIN AND TAZOBACTAM 25 GRAM(S): 4; .5 INJECTION, POWDER, LYOPHILIZED, FOR SOLUTION INTRAVENOUS at 13:06

## 2017-01-01 RX ADMIN — Medication 500 MILLIGRAM(S): at 14:10

## 2017-01-01 RX ADMIN — PRIMIDONE 250 MILLIGRAM(S): 250 TABLET ORAL at 00:59

## 2017-01-01 RX ADMIN — ONDANSETRON 4 MILLIGRAM(S): 8 TABLET, FILM COATED ORAL at 18:35

## 2017-01-01 RX ADMIN — Medication 3 MILLILITER(S): at 09:13

## 2017-01-01 RX ADMIN — HEPARIN SODIUM 5000 UNIT(S): 5000 INJECTION INTRAVENOUS; SUBCUTANEOUS at 05:38

## 2017-01-01 RX ADMIN — CEFTAZIDIME 107.2 GRAM(S): 6 INJECTION, POWDER, FOR SOLUTION INTRAVENOUS at 15:58

## 2017-01-01 RX ADMIN — Medication 4 MILLILITER(S): at 09:13

## 2017-01-01 RX ADMIN — PIPERACILLIN AND TAZOBACTAM 25 GRAM(S): 4; .5 INJECTION, POWDER, LYOPHILIZED, FOR SOLUTION INTRAVENOUS at 09:25

## 2017-01-01 RX ADMIN — HEPARIN SODIUM 5000 UNIT(S): 5000 INJECTION INTRAVENOUS; SUBCUTANEOUS at 06:48

## 2017-01-01 RX ADMIN — Medication 3 MILLILITER(S): at 20:47

## 2017-01-01 RX ADMIN — PRIMIDONE 250 MILLIGRAM(S): 250 TABLET ORAL at 23:47

## 2017-01-01 RX ADMIN — PIPERACILLIN AND TAZOBACTAM 25 GRAM(S): 4; .5 INJECTION, POWDER, LYOPHILIZED, FOR SOLUTION INTRAVENOUS at 13:34

## 2017-01-01 RX ADMIN — HEPARIN SODIUM 5000 UNIT(S): 5000 INJECTION INTRAVENOUS; SUBCUTANEOUS at 13:07

## 2017-01-01 RX ADMIN — Medication 1 DROP(S): at 05:41

## 2017-01-01 RX ADMIN — LEVETIRACETAM 1250 MILLIGRAM(S): 250 TABLET, FILM COATED ORAL at 22:16

## 2017-01-01 RX ADMIN — LEVETIRACETAM 1500 MILLIGRAM(S): 250 TABLET, FILM COATED ORAL at 14:10

## 2017-01-01 RX ADMIN — Medication 250 MILLIGRAM(S): at 18:51

## 2017-01-01 RX ADMIN — Medication 1 DROP(S): at 11:38

## 2017-01-01 RX ADMIN — HEPARIN SODIUM 5000 UNIT(S): 5000 INJECTION INTRAVENOUS; SUBCUTANEOUS at 13:56

## 2017-01-01 RX ADMIN — Medication 3 MILLILITER(S): at 21:51

## 2017-01-01 RX ADMIN — PANTOPRAZOLE SODIUM 40 MILLIGRAM(S): 20 TABLET, DELAYED RELEASE ORAL at 12:47

## 2017-01-01 RX ADMIN — HEPARIN SODIUM 5000 UNIT(S): 5000 INJECTION INTRAVENOUS; SUBCUTANEOUS at 21:47

## 2017-01-01 RX ADMIN — Medication 500 MILLIGRAM(S): at 13:16

## 2017-01-01 RX ADMIN — CEFTAZIDIME 107.2 GRAM(S): 6 INJECTION, POWDER, FOR SOLUTION INTRAVENOUS at 06:44

## 2017-01-01 RX ADMIN — Medication 1 MILLIGRAM(S): at 11:02

## 2017-01-01 RX ADMIN — PANTOPRAZOLE SODIUM 40 MILLIGRAM(S): 20 TABLET, DELAYED RELEASE ORAL at 12:16

## 2017-01-01 RX ADMIN — Medication 25 MILLIGRAM(S): at 05:26

## 2017-01-01 RX ADMIN — PIPERACILLIN AND TAZOBACTAM 25 GRAM(S): 4; .5 INJECTION, POWDER, LYOPHILIZED, FOR SOLUTION INTRAVENOUS at 13:53

## 2017-01-01 RX ADMIN — Medication 1 MILLIGRAM(S): at 14:10

## 2017-01-01 RX ADMIN — Medication 3 MILLILITER(S): at 03:10

## 2017-01-01 RX ADMIN — Medication 1 MILLIGRAM(S): at 11:53

## 2017-01-01 RX ADMIN — PANTOPRAZOLE SODIUM 40 MILLIGRAM(S): 20 TABLET, DELAYED RELEASE ORAL at 11:46

## 2017-01-01 RX ADMIN — PRIMIDONE 250 MILLIGRAM(S): 250 TABLET ORAL at 13:07

## 2017-01-01 RX ADMIN — Medication 3 MILLILITER(S): at 23:02

## 2017-01-01 RX ADMIN — Medication 20 MILLIGRAM(S): at 21:42

## 2017-01-01 RX ADMIN — Medication 3 MILLILITER(S): at 10:48

## 2017-01-01 RX ADMIN — Medication 500 MILLIGRAM(S): at 11:38

## 2017-01-01 RX ADMIN — Medication 3 MILLILITER(S): at 02:08

## 2017-01-01 RX ADMIN — Medication 500 MILLIGRAM(S): at 13:42

## 2017-01-01 RX ADMIN — PIPERACILLIN AND TAZOBACTAM 25 GRAM(S): 4; .5 INJECTION, POWDER, LYOPHILIZED, FOR SOLUTION INTRAVENOUS at 05:37

## 2017-01-01 RX ADMIN — LEVETIRACETAM 1250 MILLIGRAM(S): 250 TABLET, FILM COATED ORAL at 23:47

## 2017-01-01 RX ADMIN — PIPERACILLIN AND TAZOBACTAM 25 GRAM(S): 4; .5 INJECTION, POWDER, LYOPHILIZED, FOR SOLUTION INTRAVENOUS at 23:17

## 2017-01-01 RX ADMIN — PANTOPRAZOLE SODIUM 40 MILLIGRAM(S): 20 TABLET, DELAYED RELEASE ORAL at 13:14

## 2017-01-01 RX ADMIN — PRIMIDONE 250 MILLIGRAM(S): 250 TABLET ORAL at 05:26

## 2017-01-01 RX ADMIN — Medication 3 MILLILITER(S): at 20:12

## 2017-01-01 RX ADMIN — PRIMIDONE 250 MILLIGRAM(S): 250 TABLET ORAL at 13:48

## 2017-01-01 RX ADMIN — Medication 20 MILLIGRAM(S): at 13:06

## 2017-01-01 RX ADMIN — Medication 3 MILLILITER(S): at 08:41

## 2017-01-01 RX ADMIN — Medication 1 MILLIGRAM(S): at 13:15

## 2017-01-01 RX ADMIN — PIPERACILLIN AND TAZOBACTAM 25 GRAM(S): 4; .5 INJECTION, POWDER, LYOPHILIZED, FOR SOLUTION INTRAVENOUS at 21:43

## 2017-01-01 RX ADMIN — Medication 20 MILLIGRAM(S): at 05:38

## 2017-01-01 RX ADMIN — Medication 650 MILLIGRAM(S): at 02:43

## 2017-01-01 RX ADMIN — LEVETIRACETAM 1500 MILLIGRAM(S): 250 TABLET, FILM COATED ORAL at 12:47

## 2017-01-01 RX ADMIN — Medication 250 MILLIGRAM(S): at 05:42

## 2017-01-01 RX ADMIN — Medication 1 MILLIGRAM(S): at 11:46

## 2017-01-01 RX ADMIN — SODIUM CHLORIDE 100 MILLILITER(S): 9 INJECTION, SOLUTION INTRAVENOUS at 00:29

## 2017-01-01 RX ADMIN — PRIMIDONE 250 MILLIGRAM(S): 250 TABLET ORAL at 07:29

## 2017-01-01 RX ADMIN — Medication 3 MILLILITER(S): at 15:00

## 2017-01-01 RX ADMIN — Medication 1 MILLIGRAM(S): at 12:16

## 2017-01-01 RX ADMIN — ENOXAPARIN SODIUM 40 MILLIGRAM(S): 100 INJECTION SUBCUTANEOUS at 18:51

## 2017-01-01 RX ADMIN — PRIMIDONE 250 MILLIGRAM(S): 250 TABLET ORAL at 14:07

## 2017-01-01 RX ADMIN — Medication 3 MILLILITER(S): at 15:24

## 2017-01-01 RX ADMIN — Medication 3 MILLILITER(S): at 14:30

## 2017-01-01 RX ADMIN — Medication 1 DROP(S): at 17:26

## 2017-01-01 RX ADMIN — Medication 20 MILLIGRAM(S): at 06:12

## 2017-01-01 RX ADMIN — Medication 3 MILLILITER(S): at 14:15

## 2017-01-01 RX ADMIN — Medication 3: at 07:48

## 2017-01-01 RX ADMIN — PIPERACILLIN AND TAZOBACTAM 25 GRAM(S): 4; .5 INJECTION, POWDER, LYOPHILIZED, FOR SOLUTION INTRAVENOUS at 21:46

## 2017-01-01 RX ADMIN — LEVETIRACETAM 1250 MILLIGRAM(S): 250 TABLET, FILM COATED ORAL at 00:55

## 2017-01-01 RX ADMIN — Medication 650 MILLIGRAM(S): at 13:27

## 2017-01-01 RX ADMIN — Medication 1 MILLIGRAM(S): at 11:38

## 2017-01-01 RX ADMIN — Medication 3 MILLILITER(S): at 20:10

## 2017-01-01 RX ADMIN — HEPARIN SODIUM 5000 UNIT(S): 5000 INJECTION INTRAVENOUS; SUBCUTANEOUS at 13:19

## 2017-01-01 RX ADMIN — HEPARIN SODIUM 5000 UNIT(S): 5000 INJECTION INTRAVENOUS; SUBCUTANEOUS at 13:49

## 2017-01-01 RX ADMIN — PRIMIDONE 250 MILLIGRAM(S): 250 TABLET ORAL at 22:44

## 2017-01-01 RX ADMIN — Medication 3 MILLILITER(S): at 08:21

## 2017-01-01 RX ADMIN — LEVETIRACETAM 1500 MILLIGRAM(S): 250 TABLET, FILM COATED ORAL at 13:15

## 2017-01-01 RX ADMIN — Medication 40 MILLIGRAM(S): at 05:20

## 2017-01-01 RX ADMIN — PRIMIDONE 250 MILLIGRAM(S): 250 TABLET ORAL at 13:20

## 2017-01-01 RX ADMIN — CEFTAZIDIME 107.2 GRAM(S): 6 INJECTION, POWDER, FOR SOLUTION INTRAVENOUS at 23:47

## 2017-01-01 RX ADMIN — Medication 3 MILLILITER(S): at 22:57

## 2017-01-01 RX ADMIN — Medication 50 MILLIGRAM(S): at 06:49

## 2017-01-01 RX ADMIN — PRIMIDONE 250 MILLIGRAM(S): 250 TABLET ORAL at 19:47

## 2017-01-01 RX ADMIN — LEVETIRACETAM 1500 MILLIGRAM(S): 250 TABLET, FILM COATED ORAL at 11:53

## 2017-01-01 RX ADMIN — PRIMIDONE 250 MILLIGRAM(S): 250 TABLET ORAL at 23:18

## 2017-01-01 RX ADMIN — Medication 3 MILLILITER(S): at 20:19

## 2017-01-01 RX ADMIN — Medication 3 MILLILITER(S): at 14:24

## 2017-01-01 RX ADMIN — PIPERACILLIN AND TAZOBACTAM 200 GRAM(S): 4; .5 INJECTION, POWDER, LYOPHILIZED, FOR SOLUTION INTRAVENOUS at 10:14

## 2017-01-01 RX ADMIN — Medication 20 MILLIGRAM(S): at 05:48

## 2017-01-01 RX ADMIN — PIPERACILLIN AND TAZOBACTAM 25 GRAM(S): 4; .5 INJECTION, POWDER, LYOPHILIZED, FOR SOLUTION INTRAVENOUS at 05:47

## 2017-01-01 RX ADMIN — PRIMIDONE 250 MILLIGRAM(S): 250 TABLET ORAL at 21:42

## 2017-01-01 RX ADMIN — Medication 25 MILLIGRAM(S): at 06:35

## 2017-01-01 RX ADMIN — HEPARIN SODIUM 5000 UNIT(S): 5000 INJECTION INTRAVENOUS; SUBCUTANEOUS at 22:44

## 2017-01-01 RX ADMIN — Medication 3 MILLILITER(S): at 09:46

## 2017-01-01 RX ADMIN — Medication 650 MILLIGRAM(S): at 09:27

## 2017-01-01 RX ADMIN — PIPERACILLIN AND TAZOBACTAM 200 GRAM(S): 4; .5 INJECTION, POWDER, LYOPHILIZED, FOR SOLUTION INTRAVENOUS at 00:28

## 2017-01-01 RX ADMIN — PRIMIDONE 250 MILLIGRAM(S): 250 TABLET ORAL at 21:51

## 2017-01-01 RX ADMIN — Medication 3 MILLILITER(S): at 08:09

## 2017-01-01 RX ADMIN — Medication 25 MILLIGRAM(S): at 17:56

## 2017-01-01 RX ADMIN — Medication 3: at 12:02

## 2017-01-01 RX ADMIN — PIPERACILLIN AND TAZOBACTAM 25 GRAM(S): 4; .5 INJECTION, POWDER, LYOPHILIZED, FOR SOLUTION INTRAVENOUS at 05:11

## 2017-01-01 RX ADMIN — Medication 650 MILLIGRAM(S): at 16:26

## 2017-01-01 RX ADMIN — HEPARIN SODIUM 5000 UNIT(S): 5000 INJECTION INTRAVENOUS; SUBCUTANEOUS at 14:06

## 2017-01-01 RX ADMIN — SODIUM CHLORIDE 75 MILLILITER(S): 9 INJECTION, SOLUTION INTRAVENOUS at 02:43

## 2017-01-01 RX ADMIN — SODIUM CHLORIDE 1000 MILLILITER(S): 9 INJECTION INTRAMUSCULAR; INTRAVENOUS; SUBCUTANEOUS at 22:23

## 2017-01-01 RX ADMIN — Medication 20 MILLIGRAM(S): at 06:34

## 2017-01-01 RX ADMIN — Medication 20 MILLIGRAM(S): at 22:44

## 2017-01-01 RX ADMIN — Medication 1 TABLET(S): at 13:42

## 2017-01-01 RX ADMIN — PRIMIDONE 250 MILLIGRAM(S): 250 TABLET ORAL at 21:58

## 2017-01-01 RX ADMIN — HEPARIN SODIUM 5000 UNIT(S): 5000 INJECTION INTRAVENOUS; SUBCUTANEOUS at 05:26

## 2017-01-01 RX ADMIN — Medication 25 MILLIGRAM(S): at 09:59

## 2017-01-01 RX ADMIN — Medication 40 MILLIGRAM(S): at 06:44

## 2017-01-01 RX ADMIN — HEPARIN SODIUM 5000 UNIT(S): 5000 INJECTION INTRAVENOUS; SUBCUTANEOUS at 13:44

## 2017-01-01 RX ADMIN — HEPARIN SODIUM 5000 UNIT(S): 5000 INJECTION INTRAVENOUS; SUBCUTANEOUS at 23:16

## 2017-01-01 RX ADMIN — HEPARIN SODIUM 5000 UNIT(S): 5000 INJECTION INTRAVENOUS; SUBCUTANEOUS at 21:58

## 2017-01-01 RX ADMIN — Medication 20 MILLIGRAM(S): at 05:58

## 2017-01-01 RX ADMIN — Medication 1 TABLET(S): at 11:27

## 2017-01-01 RX ADMIN — Medication 1 DROP(S): at 13:06

## 2017-01-01 RX ADMIN — Medication 25 MILLIGRAM(S): at 06:13

## 2017-01-01 RX ADMIN — Medication 1 TABLET(S): at 11:01

## 2017-01-01 RX ADMIN — PANTOPRAZOLE SODIUM 40 MILLIGRAM(S): 20 TABLET, DELAYED RELEASE ORAL at 13:22

## 2017-01-01 RX ADMIN — Medication 20 MILLIGRAM(S): at 13:16

## 2017-01-01 RX ADMIN — Medication 20 MILLIGRAM(S): at 13:48

## 2017-01-01 RX ADMIN — PIPERACILLIN AND TAZOBACTAM 25 GRAM(S): 4; .5 INJECTION, POWDER, LYOPHILIZED, FOR SOLUTION INTRAVENOUS at 05:26

## 2017-01-01 RX ADMIN — MIDODRINE HYDROCHLORIDE 5 MILLIGRAM(S): 2.5 TABLET ORAL at 19:47

## 2017-01-01 RX ADMIN — Medication 20 MILLIGRAM(S): at 14:06

## 2017-01-01 RX ADMIN — Medication 3 MILLILITER(S): at 02:30

## 2017-01-01 RX ADMIN — PRIMIDONE 250 MILLIGRAM(S): 250 TABLET ORAL at 05:58

## 2017-01-01 RX ADMIN — HEPARIN SODIUM 5000 UNIT(S): 5000 INJECTION INTRAVENOUS; SUBCUTANEOUS at 05:20

## 2017-01-01 RX ADMIN — PANTOPRAZOLE SODIUM 40 MILLIGRAM(S): 20 TABLET, DELAYED RELEASE ORAL at 09:18

## 2017-01-01 RX ADMIN — Medication 20 MILLIGRAM(S): at 13:21

## 2017-01-01 RX ADMIN — CEFEPIME 100 MILLIGRAM(S): 1 INJECTION, POWDER, FOR SOLUTION INTRAMUSCULAR; INTRAVENOUS at 00:47

## 2017-01-01 RX ADMIN — Medication 100 MILLIGRAM(S): at 00:47

## 2017-01-01 RX ADMIN — Medication 20 MILLIGRAM(S): at 23:16

## 2017-01-01 RX ADMIN — PIPERACILLIN AND TAZOBACTAM 25 GRAM(S): 4; .5 INJECTION, POWDER, LYOPHILIZED, FOR SOLUTION INTRAVENOUS at 06:48

## 2017-01-01 RX ADMIN — PRIMIDONE 250 MILLIGRAM(S): 250 TABLET ORAL at 06:49

## 2017-01-01 RX ADMIN — Medication 4 MILLILITER(S): at 22:57

## 2017-01-01 RX ADMIN — Medication 3 MILLILITER(S): at 08:44

## 2017-01-01 RX ADMIN — Medication 40 MILLIGRAM(S): at 22:58

## 2017-01-01 RX ADMIN — Medication 3 MILLILITER(S): at 15:01

## 2017-01-01 RX ADMIN — Medication 1 MILLIGRAM(S): at 13:44

## 2017-01-02 NOTE — H&P ADULT. - PROBLEM SELECTOR PLAN 1
patient admitted due to sepsis 2/2 aspiration PNA  on admission pt with WBC count of 25.7, lactate 2.4  pt was given 1L NS bolus  will c/w NPO for now and monitor overnight, restart diet in AM after reevaluated   c/w cefepime and flagyl  c/w bronchodilators   c/w Zofran PRN for nausea / vomiting   c/w aspiration precautions   f/u blood Cx patient admitted due to sepsis 2/2 aspiration PNA  on admission pt with WBC count of 25.7, lactate 2.4  pt was given 1L NS bolus  will c/w NPO for now and monitor overnight, restart diet in AM after reevaluated   c/w cefepime and flagyl  c/w bronchodilators   c/w Zofran PRN for nausea / vomiting   c/w aspiration precautions   f/u blood Cx  ID Dr. Collins consulted patient admitted due to sepsis 2/2 aspiration PNA  on admission pt with WBC count of 25.7, tachycardia, lactate 2.4  pt was given 1L NS bolus  will c/w NPO for now and monitor overnight, restart diet in AM after reevaluated   c/w cefepime and flagyl  c/w bronchodilators   c/w Zofran PRN for nausea / vomiting   c/w aspiration precautions   f/u blood Cx  ID Dr. Collins consulted

## 2017-01-02 NOTE — H&P ADULT. - HISTORY OF PRESENT ILLNESS
77 y/o F from [facility], bedbound and non verbal at baseline. PMH of CVA, advanced Alzheimer's dementia (s/p PEG tube), Afib (no AC 2/2 GI bleed) and Seizures (on Keppra and primidone). Patient was sent from NH for SOB, tachycardia and respiratory distress, pt had 2 episodes of vomiting in NH with concern for aspiration PNA. Pt recently treated for septic shock with bacteremia 2/2 E.coli and growing pseudomonas sputum Cx. Pt was tx with IV Zosyn from 11/5 - 11/22/2016. Unable to asses ROS due to dementia.     As per NH record and son pt is DNR 77 y/o F from [facility], bedbound and non verbal at baseline. PMH of CVA, advanced Alzheimer's dementia (s/p PEG tube), Afib (no AC 2/2 GI bleed) and Seizures (on Keppra and primidone).   Patient was sent from NH for SOB, tachycardia and respiratory distress, pt had 2 episodes of vomiting in NH with concern for aspiration PNA.   Pt recently treated for septic shock with bacteremia 2/2 E.coli and growing pseudomonas sputum Cx. Pt was tx with IV Zosyn from 11/5 - 11/22/2016. Unable to asses ROS due to dementia.     As per NH record and son pt is DNR

## 2017-01-02 NOTE — H&P ADULT. - GASTROINTESTINAL DETAILS
soft/no rebound tenderness/nontender/no masses palpable/no bruit/bowel sounds normal/no organomegaly/no rigidity/PEG tube in place/no guarding/normal

## 2017-01-02 NOTE — ED PROVIDER NOTE - CONDUCTED A DETAILED DISCUSSION WITH PATIENT AND/OR GUARDIAN REGARDING, MDM
return to ED if symptoms worsen, persist or questions arise/need for outpatient follow-up/lab results need to admit/lab results/radiology results

## 2017-01-02 NOTE — ED ADULT NURSE NOTE - OBJECTIVE STATEMENT
pt is here from [facility] home for resp distress sat is 96 on 100 nrb , hr 110 on tele , lethargic son at bedside as per son pt's baseline mental status

## 2017-01-02 NOTE — H&P ADULT. - PROBLEM SELECTOR PLAN 5
will hold BP medications for now as pt hypotensive due to sepsis  restart medications when appropriate   f/u TSH, lipid profile and HbA1C

## 2017-01-02 NOTE — PATIENT PROFILE ADULT. - TEACHING/LEARNING LEARNING PREFERENCES
written material/verbal instruction/skill demonstration to HCP/written material/individual instruction/skill demonstration/verbal instruction

## 2017-01-02 NOTE — H&P ADULT. - ASSESSMENT
75 y/o F from [facility], bedbound and non verbal at baseline. PMH of CVA, advanced Alzheimer's dementia (s/p PEG tube), Afib (no AC 2/2 GI bleed) and Seizures (on Keppra and primidone). Patient was sent from NH for SOB, tachycardia and respiratory distress, pt had 2 episodes of vomiting in NH with concern for aspiration PNA. Pt recently treated for septic shock with bacteremia 2/2 E.coli and growing pseudomonas sputum Cx. Pt was tx with IV Zosyn from 11/5 - 11/22/2016. Unable to asses ROS due to dementia.     As per NH record and son pt is DNR

## 2017-01-02 NOTE — ED PROVIDER NOTE - CARE PLAN
Principal Discharge DX:	PNA (pneumonia)  Secondary Diagnosis:	Dehydration  Secondary Diagnosis:	Abnormal LFTs (liver function tests)

## 2017-01-02 NOTE — H&P ADULT. - ATTENDING COMMENTS
Patient seen, examined, and interviewed by me, case discussed with me, chart reviewed, agree with above H/P as reviewed and edited by me.  covering for Dr Sutton.

## 2017-01-02 NOTE — PATIENT PROFILE ADULT. - MEDICATION, ABILITY TO FOLLOW SCHEDULE, PROFILE
lack of knowledge regarding managing health concern/inability to prepare and administer dose correctly

## 2017-01-02 NOTE — ED PROVIDER NOTE - NS ED MD SCRIBE ATTENDING SCRIBE SECTIONS
REVIEW OF SYSTEMS/HISTORY OF PRESENT ILLNESS/PHYSICAL EXAM/PAST MEDICAL/SURGICAL/SOCIAL HISTORY/VITAL SIGNS( Pullset)/DISPOSITION

## 2017-01-02 NOTE — H&P ADULT. - PROBLEM SELECTOR PLAN 3
primidone SE NV  c/w seizure medications as per son last seizure activity 1 year ago  primidone SE NV  c/w seizure medications  Neurology consulted Dr. Abernathy

## 2017-01-02 NOTE — ED PROVIDER NOTE - OBJECTIVE STATEMENT
77 y/o F pt from nursing home, DNR w/ PMHx of HTN, dementia, CVA and A-Fib BIB EMS to the ED c/o tachycardia, O2 stat from 93%-95% and vomiting x2 today. 77 y/o F pt from nursing home, DNR w/ PMHx of HTN, dementia, CVA and A-Fib BIB EMS to the ED c/o tachycardia, O2 stat from 93%-95% and vomiting x2 today. Unable to get further information due to dementia. Pt denies any other complaints. NKDA.

## 2017-01-04 NOTE — DIETITIAN INITIAL EVALUATION ADULT. - PROBLEM SELECTOR PLAN 1
patient admitted due to sepsis 2/2 aspiration PNA  on admission pt with WBC count of 25.7, tachycardia, lactate 2.4  pt was given 1L NS bolus  will c/w NPO for now and monitor overnight, restart diet in AM after reevaluated   c/w cefepime and flagyl  c/w bronchodilators   c/w Zofran PRN for nausea / vomiting   c/w aspiration precautions   f/u blood Cx  ID Dr. Collins consulted

## 2017-01-04 NOTE — DIETITIAN INITIAL EVALUATION ADULT. - ETIOLOGY
R/T Swallwoing difficulty,CVA R/T Increased In demand for wound Healing R/T Increased In demand for wound Healing,sepsis

## 2017-01-04 NOTE — DIETITIAN INITIAL EVALUATION ADULT. - OTHER INFO
Pt visited. Asleep.Ot is From NH was on Jevity 1.5 1500 calories+mayra+prostat.Pt is on IV fluid.At present npo/no Tf   since pt is with Respiratory Distress.

## 2017-01-04 NOTE — DIETITIAN INITIAL EVALUATION ADULT. - PROBLEM SELECTOR PLAN 3
as per son last seizure activity 1 year ago  primidone SE NV  c/w seizure medications  Neurology consulted Dr. Abernathy

## 2017-01-11 NOTE — DISCHARGE NOTE ADULT - CARE PROVIDER_API CALL
Volodymyr Sutton), Internal Medicine  [address] [place] [address]  Phone: [phone]  Fax: [phone]

## 2017-01-11 NOTE — DISCHARGE NOTE ADULT - CARE PLAN
Principal Discharge DX:	Sepsis, due to unspecified organism  Goal:	resolved  Instructions for follow-up, activity and diet:	followup with PCP  Secondary Diagnosis:	CVA (cerebral vascular accident)  Goal:	resolved acutely  Instructions for follow-up, activity and diet:	followup with PCP  Secondary Diagnosis:	Dementia  Goal:	supportive care  Instructions for follow-up, activity and diet:	followup with PCP  Secondary Diagnosis:	Abnormal LFTs (liver function tests)  Goal:	resolution  Instructions for follow-up, activity and diet:	followup with PCP  Secondary Diagnosis:	Hypertension  Goal:	<140/90  Instructions for follow-up, activity and diet:	Followup with PCP  Secondary Diagnosis:	PNA (pneumonia)  Goal:	resolved  Instructions for follow-up, activity and diet:	Followup with PCP

## 2017-01-11 NOTE — DISCHARGE NOTE ADULT - MEDICATION SUMMARY - MEDICATIONS TO TAKE
I will START or STAY ON the medications listed below when I get home from the hospital:    predniSONE 10 mg oral tablet  --  by mouth   40mg(4tab) x 3d  30mg(3tab) x 3d  20mg(2tab) x 3d  10mg)1tab) x 3d  -- Indication: For PNA (pneumonia)    propranolol 10 mg oral tablet  -- 1 tab(s) by mouth every 6 hours  -- Indication: For Essential hypertension    levETIRAcetam 100 mg/mL oral solution  -- 12.5 milliliter(s) by mouth 2 times a day  -- Indication: For Seizures    levETIRAcetam 100 mg/mL oral solution  -- 15 milliliter(s) by mouth once a day  -- Indication: For Seizures    primidone 250 mg oral tablet  -- 1 tab(s) by mouth 3 times a day  -- Indication: For Seizures    metoprolol tartrate 25 mg oral tablet  -- 1 tab(s) by mouth 2 times a day  -- Indication: For Hypertension    ipratropium-albuterol 0.5 mg-2.5 mg/3 mLinhalation solution  -- 3 milliliter(s) inhaled every 8 hours  -- Indication: For PNA (pneumonia)    metOLazone 5 mg oral tablet  -- 1 tab(s) by gastrostomy tube once a day  -- Indication: For Hypertension    ocular lubricant ophthalmic solution  -- 1 drop(s) to each affected eye 2 times a day  -- Indication: For Prophylactic measure    pantoprazole 40 mg oral granule, enteric coated  -- 40 milligram(s) by gastrostomy tube once a day  -- Indication: For Prophylactic measure    Multiple Vitamins oral liquid  -- 5 milliliter(s) by gastrostomy tube once a day  -- Indication: For Health Maintenence    Oyster Shell Calcium with Vitamin D 500 mg-200 intl units oral tablet  -- 1 tab(s) by gastrostomy tube once a day  -- Indication: For Health Maintenance    Vitamin C 500 mg oral tablet  -- 1 tab(s) by gastrostomy tube once a day  -- Indication: For Health Maintenance    folic acid 1 mg oral tablet  -- 1 tab(s) by mouth once a day  -- Indication: For Health Maintenance

## 2017-01-11 NOTE — DISCHARGE NOTE ADULT - SECONDARY DIAGNOSIS.
CVA (cerebral vascular accident) Dementia Abnormal LFTs (liver function tests) Hypertension PNA (pneumonia)

## 2017-01-11 NOTE — DISCHARGE NOTE ADULT - HOSPITAL COURSE
75 y/o F from [facility], bedbound and non verbal at baseline. PMH of CVA, advanced Alzheimer's dementia (s/p PEG tube), Afib (no AC 2/2 GI bleed) and Seizures (on Keppra and primidone). Patient was sent from NH for SOB, tachycardia and respiratory distress, pt had 2 episodes of vomiting in NH with concern for aspiration PNA. Pt recently treated for septic shock with bacteremia 2/2 E.coli and growing pseudomonas sputum Cx. Pt was tx with IV Zosyn from 11/5 - 11/22/2016. Unable to asses ROS due to dementia.    As per NH record and son pt is DNR    Sepsis 2/2 PNA.      Patient admitted due to sepsis 2/2 aspiration PNA  on admission pt with WBC count of 25.7, tachycardia, lactate 2.4  pt was given 1L NS bolus.  CXR showed mild diffuse interstitial prominence.  UA mildly positive.  c/w cefepime and flagyl  c/w bronchodilators   c/w Zofran PRN for nausea / vomiting   c/w aspiration precautions   f/u blood Cx  ID Dr. Collins continued empiric ABT therapy and followed patient. WBC trended down to 11.7. UC/BC negative.  Dr. Collins continued the pt on broad spectrum while inpt.      Dehydration.       BUN / Cr ration > 20:1  pt was given 1L NS bolus   f/u UA posititve, and placed on ABT's  and culture   Creatinine normal.      Seizures.      As per son, last seizure activity 1 year ago  primidone SE NV  c/w seizure medications  Neurology consulted Dr. Abernathy.     Abnormal LFTs (liver function tests).      Possibly 2/2 Keppra  f/u hepatic function test and consider changing medications.     Stage 4 ulcer     Pt seen by surgery who recommended wet to dry dsgs and frequent turning and positioning.      Essential hypertension.       BP medications for now as pt hypotensive due to sepsis  BP monitored.    Late onset Alzheimer's disease with behavioral disturbance.     Pt with advanced dementia 75 y/o female from [facility] w/ PMH of bedbound, non verbal at baseline, CVA, advanced Alzheimer's Dementia (s/p PEG tube), Afib (no AC 2/2 GI bleed) and Seizures (on Keppra and primidone).  Presented to the ED w/ tachycardia and respiratory distress.    In the ED vital signs: T 97.9, , /66, R 18, O2 97%    Admitted to medicine for Pneumonia    Sepsis  Blood cultures negative x 2  Urine cultures negative  Infectious Disease-Dr. Collins consulted and antibiotics for aspiration pneumonia given    Dehydration  IVF given    Seizures  Neurologist-Dr. Abernathy consulted  Treated w/ Levetiracetam & Primidone    Abnormal LFTs (Liver Function Tests)  Liver function tests monitored and stable    Essential hypertension  Home medications continued    Stage 4 sacral ulcer  Surgery consulted and no surgical intervention  Wound care w/ wet to dry dressing    DVT & GI prophylaxis given

## 2017-01-21 NOTE — ED ADULT NURSE NOTE - ED STAT RN HANDOFF DETAILS 4
Received pt from [facility] Nguyen. Pt is responsive to tactile stimuli. Pt is on 100 non-rebreather mask when I receive the pt. IV Vancomycin running during endorsement. Safety maintained. Will continue to monitor.

## 2017-01-21 NOTE — H&P ADULT. - PROBLEM SELECTOR PLAN 1
pt admitted due to sepsis 2/2 HCAP  on admission WBC 21 and normal lactate level   ABG showing respiratory acidosis, pt not a candidate to BiPAP and son requested for pt to remain DNR /DNI   CXR showing B/L LL infiltrates, concern for possible 2/2 aspiration PNA  SOFA score of 8  c/w bronchodilators  c/w solumedrol and jeanette down as tolerated   c/w Tylenol as needed for fever   c/w NPO for now and restart feeding as tolerated   f/u blood Cx

## 2017-01-21 NOTE — ED PROVIDER NOTE - MEDICAL DECISION MAKING DETAILS
Son refuses intubation, pt has DNR order from NH. Pt is not candidate for Bipapa due to aspiration risk. Dr. mcclure and MAR endorsed. Pt's son agrees with admission.

## 2017-01-21 NOTE — H&P ADULT. - ASSESSMENT
77 y/o F from [facility], bedbound and non verbal at baseline. PMH of CVA, advanced Alzheimer's and vascular dementia (s/p PEG tube), Afib (no AC 2/2 GI bleed), sacral decubitus ulcer stage IV and Seizures (on Keppra and primidone). Patient was previously admitted earlier this month due to aspiration pneumonia, and was treated with course of Zosyn for 9 days. Pt was send from NH due to acute shortness of breath (despite 2L NC) and fever of 102, pt was being treated in NH with Levaquin and Flagyl. Pt was admitted due to concern for septic shock.     On admission pt had respiratory distress with ABG showing 7.36/73/149/40/99 in 100% NRB, pt is not a candidate for BiPAP due to AMS and son requested for pt to be DNR /DNI.

## 2017-01-21 NOTE — H&P ADULT. - PROBLEM SELECTOR PLAN 3
pt has stage IV sacral decubitus ulcer   surgery was consulted on previous admission and recommended no surgical intervention  c/w wound care

## 2017-01-21 NOTE — ED PROVIDER NOTE - ENMT, MLM
Airway patent, Nasal mucosa clear. Mouth with normal mucosa. Throat has no vesicles, no oropharyngeal exudates and uvula is midline. Airway patent, Nasal mucosa clear. Mouth with normal mucosa.

## 2017-01-21 NOTE — H&P ADULT. - ATTENDING COMMENTS
patient seen and examined along  with admitting resident   above reviewed  and agreed   also to get id consult.and pulmonary  care plan also discussed with nh rn , er attending and patient's son at bedside.  patient is dnr and son don't want any intubation at present.

## 2017-01-21 NOTE — ED PROVIDER NOTE - OBJECTIVE STATEMENT
77 y/o F pt w/ PMHx of CVA, dementia and HTN BIB EMS from nursing home to the ED c/o shortness of breath. Pt's son states that he wants pt to be intubated. Denies cough, N/V/D, or any other complaints. NKDA.

## 2017-01-21 NOTE — ED ADULT NURSE NOTE - OBJECTIVE STATEMENT
Presented to ED with EMS with labored breathing, tachypnea, and febrile at 101.4 core temp. Breathing on 100% NRB. Placed patient on cardiac monitor. IV heplock x2 inserted by Letty SOLIZ ro right and left hand. NS bolus started. Seen by MD. Code sepsis called. Son at bedside.

## 2017-01-21 NOTE — H&P ADULT. - HISTORY OF PRESENT ILLNESS
77 y/o F from [facility], bedbound and non verbal at baseline. PMH of CVA, advanced Alzheimer's and vascular dementia (s/p PEG tube), Afib (no AC 2/2 GI bleed), sacral decubitus ulcer stage IV and Seizures (on Keppra and primidone). Patient was previously admitted earlier this month due to aspiration pneumonia, and was treated with course of Zosyn for 9 days. Pt was send from NH due to acute shortness of breath (despite 2L NC) and fever of 102, pt was being treated in NH with Levaquin and Flagyl. Pt was admitted due to concern for septic shock.     On admission pt had respiratory distress with ABG showing 7.36/73/149/40/99 in 100% NRB, pt is not a candidate for BiPAP due to AMS and son requested for pt to be DNR /DNI.

## 2017-01-21 NOTE — ED PROVIDER NOTE - CRITICAL CARE PROVIDED
interpretation of diagnostic studies/consultation with other physicians/documentation/direct patient care (not related to procedure)/additional history taking/conducted a detailed discussion of DNR status/consult w/ pt's family directly relating to pts condition

## 2017-01-21 NOTE — ED PROVIDER NOTE - NS ED MD SCRIBE ATTENDING SCRIBE SECTIONS
PHYSICAL EXAM/DISPOSITION/REVIEW OF SYSTEMS/PAST MEDICAL/SURGICAL/SOCIAL HISTORY/VITAL SIGNS( Pullset)/HISTORY OF PRESENT ILLNESS/HIV

## 2017-01-21 NOTE — H&P ADULT. - PROBLEM SELECTOR PLAN 6
son and HCP understands poor prognosis considering pt frequent admission and comorbidities   requested to c/w DNR / DNI

## 2017-01-22 NOTE — PATIENT PROFILE ADULT. - VISION (WITH CORRECTIVE LENSES IF THE PATIENT USUALLY WEARS THEM):
Normal vision: sees adequately in most situations; can see medication labels, newsprint/unable to assess-unresponsive

## 2017-01-23 NOTE — DISCHARGE NOTE FOR THE EXPIRED PATIENT - HOSPITAL COURSE
75 y/o F from [facility], bedbound and non verbal at baseline. PMH of CVA, advanced Alzheimer's and vascular dementia (s/p PEG tube), Afib (no AC 2/2 GI bleed), sacral decubitus ulcer stage IV and Seizures (on Keppra and primidone). Patient was previously admitted earlier this month due to aspiration pneumonia, and was treated with course of Zosyn for 9 days. Pt was sent from NH due to acute shortness of breath (despite 2L NC) and fever of 102, pt was being treated in NH with Levaquin and Flagyl. Pt was admitted due to concern for septic shock.     On admission pt had respiratory distress with ABG showing 7.36/73/149/40/99 in 100% NRB, pt is not a candidate for BiPAP due to AMS and son requested for pt to be DNR /DNI.      Pt admitted due to sepsis 2/2 HCAP    Sepsis, due to unspecified organism.   On admission WBC 21 and normal lactate level   ABG showing respiratory acidosis, pt not a candidate to BiPAP and son requested for pt to remain DNR /DNI.  Dr. Sutton, attending, ordered ID consult with Dr. Collins and pulmonary consult with Dr. Das.  Pt started on empiric ABT.  Lytes were replaced as need.    CXR showing B/L LL infiltrates, concern for possible 2/2 aspiration PNA  SOFA score of 8  c/w bronchodilators  c/w solumedrol and jeanette down as tolerated   c/w Tylenol as needed for fever   c/w NPO for now and restart feeding as tolerated   Blood Cx negative.  RVP negative.       Hypernatremia    D5% IVF, and bmps monitored.    Essential hypertension.      Held medications for now as pt is hypotensive   restart medications as needed.     Skin ulcer of sacrum with fat layer exposed.      Pt has stage IV sacral decubitus ulcer   surgery was consulted on previous admission and recommended no surgical intervention  c/w wound care.     Chronic atrial fibrillation.      Pt not a candidate for AC due to hx of GIB.     Seizures.      c/w NH medications.     Goals of care, counseling/discussion.     Son and HCP understands poor prognosis considering pt frequent admission and comorbidities   requested to c/w DNR / DNI.    NP called by nurse for patient being unresponsive with no pulse and no spontaneous breathing, and pt examined at bedside.  EKG showed asystole.  Patient was unresponsive to both verbal and tactile stimuli, had no audible sounds, no spontaneous respiration, pupils were fixed and dilated.  No resuscitative measures were initiated because patient was on DNR/DNI MEWS suspension order.Patient was pronounced death at 14:00 hrs on 1/23/17.  Son, Percy Ryan, HCP, made aware.  Dr. Sutton, attending, made aware. 77 y/o F from [facility], bedbound and non verbal at baseline. PMH of CVA, advanced Alzheimer's and vascular dementia (s/p PEG tube), Afib (no AC 2/2 GI bleed), sacral decubitus ulcer stage IV and Seizures (on Keppra and primidone). Patient was previously admitted earlier this month due to aspiration pneumonia, and was treated with course of Zosyn for 9 days. Pt was sent from NH due to acute shortness of breath (despite 2L NC) and fever of 102, pt was being treated in NH with Levaquin and Flagyl. Pt was admitted due to concern for septic shock.     On admission pt had respiratory distress with ABG showing 7.36/73/149/40/99 in 100% NRB, pt is not a candidate for BiPAP due to AMS and son requested for pt to be DNR /DNI.      Pt admitted due to sepsis 2/2 HCAP    Sepsis, due to unspecified organism.   On admission WBC 21 and normal lactate level   ABG showing respiratory acidosis, pt not a candidate to BiPAP and son requested for pt to remain DNR /DNI.  Dr. Sutton, attending, ordered ID consult with Dr. Collins and pulmonary consult with Dr. Das.  Pt started on empiric ABT.  Lytes were replaced as need.    CXR showing B/L LL infiltrates, concern for possible 2/2 aspiration PNA  SOFA score of 8  c/w bronchodilators  c/w solumedrol and jeanette down as tolerated   c/w Tylenol as needed for fever   c/w NPO for now and restart feeding as tolerated   Blood Cx negative.  RVP negative.       Hypernatremia    D5% IVF, and bmps monitored.    Essential hypertension.      Held medications for now as pt is hypotensive   restart medications as needed.     Skin ulcer of sacrum with fat layer exposed.      Pt has stage IV sacral decubitus ulcer   surgery was consulted on previous admission and recommended no surgical intervention  c/w wound care.     Chronic atrial fibrillation.      Pt not a candidate for AC due to hx of GIB.     Seizures.      c/w NH medications.     Goals of care, counseling/discussion.     Son and HCP understands poor prognosis considering pt frequent admission and comorbidities   requested to c/w DNR / DNI.    NP called by nurse for patient being unresponsive with no pulse and no spontaneous breathing, and pt examined at bedside.  EKG showed asystole.  Patient was unresponsive to both verbal and tactile stimuli, had no audible sounds, no spontaneous respiration, pupils were fixed and dilated.  No resuscitative measures were initiated because patient was on DNR/DNI MEWS suspension order.Patient was pronounced death at 14:00 hrs on 1/23/17.  Son, Percy Ryan, HCP, made aware.  Dr. Sutton, attending, made aware.  Nereyda at [facility] contacted and she deferred case based upon patient's advanced age. 77 y/o F from [facility], bedbound and non verbal at baseline. PMH of CVA, advanced Alzheimer's and vascular dementia (s/p PEG tube), Afib (no AC 2/2 GI bleed), sacral decubitus ulcer stage IV and Seizures (on Keppra and primidone). Patient was previously admitted earlier this month due to aspiration pneumonia, and was treated with course of Zosyn for 9 days. Pt was sent from NH due to acute shortness of breath (despite 2L NC) and fever of 102, pt was being treated in NH with Levaquin and Flagyl. Pt was admitted due to concern for septic shock.     On admission pt had respiratory distress with ABG showing 7.36/73/149/40/99 in 100% NRB, pt is not a candidate for BiPAP due to AMS and son requested for pt to be DNR /DNI.      Pt admitted due to sepsis 2/2 HCAP    Sepsis, due to unspecified organism.   On admission WBC 21 and normal lactate level   ABG showing respiratory acidosis, pt not a candidate to BiPAP and son requested for pt to remain DNR /DNI.  Dr. Sutton, attending, ordered ID consult with Dr. Collins and pulmonary consult with Dr. Das.  Pt started on empiric ABT.  Lytes were replaced as need.    CXR showing B/L LL infiltrates, concern for possible 2/2 aspiration PNA  SOFA score of 8  c/w bronchodilators  c/w solumedrol and jeanette down as tolerated   c/w Tylenol as needed for fever   c/w NPO for now and restart feeding as tolerated   Blood Cx negative.  RVP negative.       Hypernatremia    D5% IVF, and bmps monitored.    Essential hypertension.      Held medications for now as pt is hypotensive   restart medications as needed.     Skin ulcer of sacrum with fat layer exposed.      Pt has stage IV sacral decubitus ulcer   surgery was consulted on previous admission and recommended no surgical intervention  c/w wound care.     Chronic atrial fibrillation.      Pt not a candidate for AC due to hx of GIB.     Seizures.      c/w NH medications.     Goals of care, counseling/discussion.     Son and HCP understands poor prognosis considering pt frequent admission and comorbidities   requested to c/w DNR / DNI.    NP called by nurse for patient being unresponsive with no pulse and no spontaneous breathing, and pt examined at bedside.  EKG showed asystole.  Patient was unresponsive to both verbal and tactile stimuli, had no audible sounds, no spontaneous respiration, pupils were fixed and dilated.  No resuscitative measures were initiated because patient was on DNR/DNI MEWS suspension order.Patient was pronounced death at 14:00 hrs on 1/23/17.  Son, Percy Ryan, HCP, made aware.  Dr. Sutton, attending, made aware.  Nereyda at [facility] contacted and she deferred case based upon patient's advanced age. Hence, no autopsy was done.

## 2017-01-23 NOTE — ED ADULT NURSE REASSESSMENT NOTE - NS ED NURSE REASSESS COMMENT FT1
01/23/17 1515 : Pt's son is t the bedside at this time.  ANGELA Gao spoke with the pt's son.
01/23/17 1536: Pt's body brought to morgue at this time.
1/23/17  1335: Pt is unresponsive to tactile stimuli at this time. No V/S appreciated. NP Anni Gao made aware. EKG done.  Awaiting for orders.
1/23/17 1400 : Pt pronounced by ANGELA Gao at this time. Post mortem care done. Organ donation contacted by Pauline Barth.
Pt was suctioned and adjusted at this time in an effort to provide comfort and improve vital signs.
120 a- relief coverage.  suction pt to open airway copiuos amount of thick yellow phlegm  suction noted. pt O2 sat remained 88 % - 90 % . rr of 40. paged MD assigned awaitring for response
130 a . pt endorse  to primary RN for continuity of care.
In response to paging resident, As per Dr. Robert Castañeda, pt is DNR/DNI, pt to receive medication as prescribed, no additional measures to be taken.
Pt was endorsed by MARTÍNEZ Bueno who informed of attempts to suction pt to which thick secretions were noted. Pts HR-120, R-40, O2 91 to which this writer paged Dr. Castillo, Dr. Preciado and Dr. Thomas to inquire about further action to be taken due to pts VS.
Pt was endorsed to MARTÍNEZ Vega. Pt maintains in bed with minimal improvement in VS, pt pending bed assigment. Pt tolerated am meds as prescribed via peg tube.
received pt in stretcher stupor nonresponsive to pain stimuli pt "moans" when turning and repositioning on 100% NRB mask appears comfortable at this time sacrum stage 4 pressure ulcer noted wound care completed at this time Son at bedside NP Bobby notified of mental status.

## 2017-01-23 NOTE — DISCHARGE NOTE FOR THE EXPIRED PATIENT - NS PATIENT DEATH CRITERIA
Patient is dead based on Cardiopulmonary criteria including absent breath sounds, pulselessness and/or asystole/including absent breath sounds, pulselessness and/or asytole

## 2017-01-26 LAB
-  AMIKACIN: SIGNIFICANT CHANGE UP
-  AZTREONAM: SIGNIFICANT CHANGE UP
-  CEFEPIME: SIGNIFICANT CHANGE UP
-  CEFTAZIDIME: SIGNIFICANT CHANGE UP
-  CIPROFLOXACIN: SIGNIFICANT CHANGE UP
-  GENTAMICIN: SIGNIFICANT CHANGE UP
-  IMIPENEM: SIGNIFICANT CHANGE UP
-  LEVOFLOXACIN: SIGNIFICANT CHANGE UP
-  MEROPENEM: SIGNIFICANT CHANGE UP
-  PIPERACILLIN/TAZOBACTAM: SIGNIFICANT CHANGE UP
-  TOBRAMYCIN: SIGNIFICANT CHANGE UP
CULTURE RESULTS: SIGNIFICANT CHANGE UP
METHOD TYPE: SIGNIFICANT CHANGE UP
ORGANISM # SPEC MICROSCOPIC CNT: SIGNIFICANT CHANGE UP
ORGANISM # SPEC MICROSCOPIC CNT: SIGNIFICANT CHANGE UP
SPECIMEN SOURCE: SIGNIFICANT CHANGE UP

## 2017-01-27 DIAGNOSIS — E87.2 ACIDOSIS: ICD-10-CM

## 2017-01-27 DIAGNOSIS — Z66 DO NOT RESUSCITATE: ICD-10-CM

## 2017-01-27 DIAGNOSIS — Z74.01 BED CONFINEMENT STATUS: ICD-10-CM

## 2017-01-27 DIAGNOSIS — G40.909 EPILEPSY, UNSPECIFIED, NOT INTRACTABLE, WITHOUT STATUS EPILEPTICUS: ICD-10-CM

## 2017-01-27 DIAGNOSIS — G93.40 ENCEPHALOPATHY, UNSPECIFIED: ICD-10-CM

## 2017-01-27 DIAGNOSIS — I69.318 OTHER SYMPTOMS AND SIGNS INVOLVING COGNITIVE FUNCTIONS FOLLOWING CEREBRAL INFARCTION: ICD-10-CM

## 2017-01-27 DIAGNOSIS — A41.89 OTHER SPECIFIED SEPSIS: ICD-10-CM

## 2017-01-27 DIAGNOSIS — J96.10 CHRONIC RESPIRATORY FAILURE, UNSPECIFIED WHETHER WITH HYPOXIA OR HYPERCAPNIA: ICD-10-CM

## 2017-01-27 DIAGNOSIS — F01.50 VASCULAR DEMENTIA WITHOUT BEHAVIORAL DISTURBANCE: ICD-10-CM

## 2017-01-27 DIAGNOSIS — I48.2 CHRONIC ATRIAL FIBRILLATION: ICD-10-CM

## 2017-01-27 DIAGNOSIS — E87.0 HYPEROSMOLALITY AND HYPERNATREMIA: ICD-10-CM

## 2017-01-27 DIAGNOSIS — I11.9 HYPERTENSIVE HEART DISEASE WITHOUT HEART FAILURE: ICD-10-CM

## 2017-01-27 DIAGNOSIS — L89.154 PRESSURE ULCER OF SACRAL REGION, STAGE 4: ICD-10-CM

## 2017-01-27 DIAGNOSIS — I08.0 RHEUMATIC DISORDERS OF BOTH MITRAL AND AORTIC VALVES: ICD-10-CM

## 2017-01-27 DIAGNOSIS — R65.21 SEVERE SEPSIS WITH SEPTIC SHOCK: ICD-10-CM

## 2017-01-27 DIAGNOSIS — J96.21 ACUTE AND CHRONIC RESPIRATORY FAILURE WITH HYPOXIA: ICD-10-CM

## 2017-01-27 DIAGNOSIS — J18.9 PNEUMONIA, UNSPECIFIED ORGANISM: ICD-10-CM

## 2017-07-29 NOTE — ED PROVIDER NOTE - CONSTITUTIONAL, MLM
Problem: Altered Thought Process (Adult/Pediatric)  Goal: *STG: Seeks staff when feelings of anxiety and fear arise  Outcome: Progressing Towards Goal  Patient is alert and oriented. She remains intrusive, manic and demanding. Required redirection and setting the limits from holding telephone  line for longer durations than allowed. Compliant with medication and meals. Denies any pain or discomfort. Continue to monitor the patient and assess needs. - - -

## 2018-10-02 NOTE — DISCHARGE NOTE ADULT - PATIENT PORTAL LINK FT
“You can access the [facility] Patient Portal, offered by [facility], by registering with the following website: [email or web address]” Show Date Of Previous Biopsy Variable: Yes

## 2021-05-03 NOTE — ED PROVIDER NOTE - CPE EDP [facility] NORM
Alejandro Umana  is a  Established patient  ,79 y.o.   female here for evaluation of the following chief complaint(s):    htn        SUBJECTIVE/OBJECTIVE:  HPI : h/o  Htn, hyperlipidimea now here  Has no cardiac complains    Review of Systems    neg    Vitals:    05/03/21 0807   BP: 124/82   Pulse: 74   Weight: 167 lb (75.8 kg)   Height: 4' 11.5\" (1.511 m)     /82   Pulse 74   Ht 4' 11.5\" (1.511 m)   Wt 167 lb (75.8 kg)   BMI 33.17 kg/m²   Patient-Reported Vitals 6/15/2020   Patient-Reported Weight 167lb   Patient-Reported Height 5 1   Patient-Reported Systolic 743   Patient-Reported Diastolic 96   Patient-Reported Pulse 67     Wt Readings from Last 3 Encounters:   05/03/21 167 lb (75.8 kg)   10/19/20 165 lb 8 oz (75.1 kg)   05/20/19 174 lb 6.4 oz (79.1 kg)     Body mass index is 33.17 kg/m². Physical Exam     Neck: JVD  no    Lungs : clear    Cardio : Si and S2 audilble      Ext: edema  no    All pertinent data reviewed      Meds : reviewed         Tests ordered        ASSESSMENT/PLAN:    -  Hypertension: Patients blood pressure is normal. Patient is advised about low sodium diet. Present medical regimen will not be changed. -  LIPID MANAGEMENT:  Importance of lipid levels discussed with patient   and patient was given dietary advice. NCEP- ATP III guidelines reviewed with patient. -   Changes  in medicines made: No                                    An electronic signature was used to authenticate this note.     --Charly Yañez MD normal...

## 2023-11-13 NOTE — PATIENT PROFILE ADULT. - CAREGIVER ADDRESS
Bill For Surgical Tray: no Billing Type: Third-Party Bill Expected Date Of Service: 06/08/2023 Performing Laboratory: [phone] [id number] [place] [address]